# Patient Record
Sex: FEMALE | Race: WHITE | NOT HISPANIC OR LATINO | Employment: UNEMPLOYED | URBAN - METROPOLITAN AREA
[De-identification: names, ages, dates, MRNs, and addresses within clinical notes are randomized per-mention and may not be internally consistent; named-entity substitution may affect disease eponyms.]

---

## 2018-06-26 ENCOUNTER — RECORDS - HEALTHEAST (OUTPATIENT)
Dept: LAB | Facility: CLINIC | Age: 83
End: 2018-06-26

## 2018-06-26 LAB — TSH SERPL DL<=0.005 MIU/L-ACNC: 3.11 UIU/ML (ref 0.3–5)

## 2018-09-04 ENCOUNTER — RECORDS - HEALTHEAST (OUTPATIENT)
Dept: LAB | Facility: CLINIC | Age: 83
End: 2018-09-04

## 2018-09-04 LAB
ANION GAP SERPL CALCULATED.3IONS-SCNC: 8 MMOL/L (ref 5–18)
BUN SERPL-MCNC: 38 MG/DL (ref 8–28)
CALCIUM SERPL-MCNC: 9 MG/DL (ref 8.5–10.5)
CHLORIDE BLD-SCNC: 102 MMOL/L (ref 98–107)
CO2 SERPL-SCNC: 29 MMOL/L (ref 22–31)
CREAT SERPL-MCNC: 1.41 MG/DL (ref 0.6–1.1)
GFR SERPL CREATININE-BSD FRML MDRD: 35 ML/MIN/1.73M2
GLUCOSE BLD-MCNC: 129 MG/DL (ref 70–125)
POTASSIUM BLD-SCNC: 4 MMOL/L (ref 3.5–5)
SODIUM SERPL-SCNC: 139 MMOL/L (ref 136–145)

## 2019-01-24 ENCOUNTER — RECORDS - HEALTHEAST (OUTPATIENT)
Dept: LAB | Facility: CLINIC | Age: 84
End: 2019-01-24

## 2019-01-24 LAB
ANION GAP SERPL CALCULATED.3IONS-SCNC: 10 MMOL/L (ref 5–18)
BUN SERPL-MCNC: 33 MG/DL (ref 8–28)
CALCIUM SERPL-MCNC: 9.4 MG/DL (ref 8.5–10.5)
CHLORIDE BLD-SCNC: 99 MMOL/L (ref 98–107)
CO2 SERPL-SCNC: 28 MMOL/L (ref 22–31)
CREAT SERPL-MCNC: 1.44 MG/DL (ref 0.6–1.1)
ERYTHROCYTE [DISTWIDTH] IN BLOOD BY AUTOMATED COUNT: 13 % (ref 11–14.5)
GFR SERPL CREATININE-BSD FRML MDRD: 34 ML/MIN/1.73M2
GLUCOSE BLD-MCNC: 122 MG/DL (ref 70–125)
HCT VFR BLD AUTO: 39.6 % (ref 35–47)
HGB BLD-MCNC: 13 G/DL (ref 12–16)
MCH RBC QN AUTO: 30.7 PG (ref 27–34)
MCHC RBC AUTO-ENTMCNC: 32.8 G/DL (ref 32–36)
MCV RBC AUTO: 94 FL (ref 80–100)
PLATELET # BLD AUTO: 323 THOU/UL (ref 140–440)
PMV BLD AUTO: 9.6 FL (ref 8.5–12.5)
POTASSIUM BLD-SCNC: 4 MMOL/L (ref 3.5–5)
RBC # BLD AUTO: 4.23 MILL/UL (ref 3.8–5.4)
SODIUM SERPL-SCNC: 137 MMOL/L (ref 136–145)
TSH SERPL DL<=0.005 MIU/L-ACNC: 3.68 UIU/ML (ref 0.3–5)
WBC: 8.7 THOU/UL (ref 4–11)

## 2019-01-25 LAB — 25(OH)D3 SERPL-MCNC: 16.2 NG/ML (ref 30–80)

## 2019-08-07 ENCOUNTER — RECORDS - HEALTHEAST (OUTPATIENT)
Dept: LAB | Facility: CLINIC | Age: 84
End: 2019-08-07

## 2019-08-07 LAB
ERYTHROCYTE [DISTWIDTH] IN BLOOD BY AUTOMATED COUNT: 13.1 % (ref 11–14.5)
HCT VFR BLD AUTO: 34.9 % (ref 35–47)
HGB BLD-MCNC: 11.8 G/DL (ref 12–16)
MCH RBC QN AUTO: 31.9 PG (ref 27–34)
MCHC RBC AUTO-ENTMCNC: 33.8 G/DL (ref 32–36)
MCV RBC AUTO: 94 FL (ref 80–100)
PLATELET # BLD AUTO: 279 THOU/UL (ref 140–440)
PMV BLD AUTO: 9.1 FL (ref 8.5–12.5)
RBC # BLD AUTO: 3.7 MILL/UL (ref 3.8–5.4)
URATE SERPL-MCNC: 9.6 MG/DL (ref 2–7.5)
WBC: 7.6 THOU/UL (ref 4–11)

## 2019-10-15 ENCOUNTER — RECORDS - HEALTHEAST (OUTPATIENT)
Dept: LAB | Facility: CLINIC | Age: 84
End: 2019-10-15

## 2019-10-15 LAB
ANION GAP SERPL CALCULATED.3IONS-SCNC: 9 MMOL/L (ref 5–18)
BASOPHILS # BLD AUTO: 0 THOU/UL (ref 0–0.2)
BASOPHILS NFR BLD AUTO: 0 % (ref 0–2)
BNP SERPL-MCNC: 76 PG/ML (ref 0–167)
BUN SERPL-MCNC: 31 MG/DL (ref 8–28)
CALCIUM SERPL-MCNC: 9.3 MG/DL (ref 8.5–10.5)
CHLORIDE BLD-SCNC: 100 MMOL/L (ref 98–107)
CO2 SERPL-SCNC: 30 MMOL/L (ref 22–31)
CREAT SERPL-MCNC: 1.39 MG/DL (ref 0.6–1.1)
EOSINOPHIL # BLD AUTO: 0 THOU/UL (ref 0–0.4)
EOSINOPHIL NFR BLD AUTO: 0 % (ref 0–6)
ERYTHROCYTE [DISTWIDTH] IN BLOOD BY AUTOMATED COUNT: 12.8 % (ref 11–14.5)
GFR SERPL CREATININE-BSD FRML MDRD: 35 ML/MIN/1.73M2
GLUCOSE BLD-MCNC: 86 MG/DL (ref 70–125)
HCT VFR BLD AUTO: 38.1 % (ref 35–47)
HGB BLD-MCNC: 12.3 G/DL (ref 12–16)
LYMPHOCYTES # BLD AUTO: 1.3 THOU/UL (ref 0.8–4.4)
LYMPHOCYTES NFR BLD AUTO: 17 % (ref 20–40)
MCH RBC QN AUTO: 31.9 PG (ref 27–34)
MCHC RBC AUTO-ENTMCNC: 32.3 G/DL (ref 32–36)
MCV RBC AUTO: 99 FL (ref 80–100)
MONOCYTES # BLD AUTO: 0.6 THOU/UL (ref 0–0.9)
MONOCYTES NFR BLD AUTO: 7 % (ref 2–10)
NEUTROPHILS # BLD AUTO: 5.8 THOU/UL (ref 2–7.7)
NEUTROPHILS NFR BLD AUTO: 75 % (ref 50–70)
PLATELET # BLD AUTO: 310 THOU/UL (ref 140–440)
PMV BLD AUTO: 9.9 FL (ref 8.5–12.5)
POTASSIUM BLD-SCNC: 4.5 MMOL/L (ref 3.5–5)
RBC # BLD AUTO: 3.86 MILL/UL (ref 3.8–5.4)
SODIUM SERPL-SCNC: 139 MMOL/L (ref 136–145)
WBC: 7.8 THOU/UL (ref 4–11)

## 2021-03-18 ENCOUNTER — RECORDS - HEALTHEAST (OUTPATIENT)
Dept: LAB | Facility: CLINIC | Age: 86
End: 2021-03-18

## 2021-03-19 LAB
25(OH)D3 SERPL-MCNC: 74.6 NG/ML (ref 30–80)
ANION GAP SERPL CALCULATED.3IONS-SCNC: 12 MMOL/L (ref 5–18)
BASOPHILS # BLD AUTO: 0 THOU/UL (ref 0–0.2)
BASOPHILS NFR BLD AUTO: 0 % (ref 0–2)
BUN SERPL-MCNC: 30 MG/DL (ref 8–28)
CALCIUM SERPL-MCNC: 9 MG/DL (ref 8.5–10.5)
CHLORIDE BLD-SCNC: 100 MMOL/L (ref 98–107)
CO2 SERPL-SCNC: 29 MMOL/L (ref 22–31)
CREAT SERPL-MCNC: 1.22 MG/DL (ref 0.6–1.1)
EOSINOPHIL # BLD AUTO: 0 THOU/UL (ref 0–0.4)
EOSINOPHIL NFR BLD AUTO: 0 % (ref 0–6)
ERYTHROCYTE [DISTWIDTH] IN BLOOD BY AUTOMATED COUNT: 13.2 % (ref 11–14.5)
GFR SERPL CREATININE-BSD FRML MDRD: 41 ML/MIN/1.73M2
GLUCOSE BLD-MCNC: 95 MG/DL (ref 70–125)
HCT VFR BLD AUTO: 37.7 % (ref 35–47)
HGB BLD-MCNC: 12.6 G/DL (ref 12–16)
IMM GRANULOCYTES # BLD: 0.1 THOU/UL
IMM GRANULOCYTES NFR BLD: 1 %
LYMPHOCYTES # BLD AUTO: 1.2 THOU/UL (ref 0.8–4.4)
LYMPHOCYTES NFR BLD AUTO: 13 % (ref 20–40)
MCH RBC QN AUTO: 31.3 PG (ref 27–34)
MCHC RBC AUTO-ENTMCNC: 33.4 G/DL (ref 32–36)
MCV RBC AUTO: 94 FL (ref 80–100)
MONOCYTES # BLD AUTO: 0.8 THOU/UL (ref 0–0.9)
MONOCYTES NFR BLD AUTO: 8 % (ref 2–10)
NEUTROPHILS # BLD AUTO: 7.4 THOU/UL (ref 2–7.7)
NEUTROPHILS NFR BLD AUTO: 78 % (ref 50–70)
PLATELET # BLD AUTO: 313 THOU/UL (ref 140–440)
PMV BLD AUTO: 9.3 FL (ref 8.5–12.5)
POTASSIUM BLD-SCNC: 3.7 MMOL/L (ref 3.5–5)
RBC # BLD AUTO: 4.03 MILL/UL (ref 3.8–5.4)
SODIUM SERPL-SCNC: 141 MMOL/L (ref 136–145)
T4 FREE SERPL-MCNC: 1.3 NG/DL (ref 0.7–1.8)
TSH SERPL DL<=0.005 MIU/L-ACNC: 5.38 UIU/ML (ref 0.3–5)
WBC: 9.5 THOU/UL (ref 4–11)

## 2021-05-12 ENCOUNTER — RECORDS - HEALTHEAST (OUTPATIENT)
Dept: LAB | Facility: CLINIC | Age: 86
End: 2021-05-12

## 2021-05-13 LAB
ANION GAP SERPL CALCULATED.3IONS-SCNC: 12 MMOL/L (ref 5–18)
BUN SERPL-MCNC: 32 MG/DL (ref 8–28)
CALCIUM SERPL-MCNC: 9.1 MG/DL (ref 8.5–10.5)
CHLORIDE BLD-SCNC: 98 MMOL/L (ref 98–107)
CO2 SERPL-SCNC: 28 MMOL/L (ref 22–31)
CREAT SERPL-MCNC: 1.33 MG/DL (ref 0.6–1.1)
ERYTHROCYTE [DISTWIDTH] IN BLOOD BY AUTOMATED COUNT: 14.1 % (ref 11–14.5)
GFR SERPL CREATININE-BSD FRML MDRD: 37 ML/MIN/1.73M2
GLUCOSE BLD-MCNC: 98 MG/DL (ref 70–125)
HCT VFR BLD AUTO: 39.7 % (ref 35–47)
HGB BLD-MCNC: 13.1 G/DL (ref 12–16)
MCH RBC QN AUTO: 30.8 PG (ref 27–34)
MCHC RBC AUTO-ENTMCNC: 33 G/DL (ref 32–36)
MCV RBC AUTO: 93 FL (ref 80–100)
PLATELET # BLD AUTO: 319 THOU/UL (ref 140–440)
PMV BLD AUTO: 9.3 FL (ref 8.5–12.5)
POTASSIUM BLD-SCNC: 3.7 MMOL/L (ref 3.5–5)
RBC # BLD AUTO: 4.25 MILL/UL (ref 3.8–5.4)
SODIUM SERPL-SCNC: 138 MMOL/L (ref 136–145)
TSH SERPL DL<=0.005 MIU/L-ACNC: 5.47 UIU/ML (ref 0.3–5)
VIT B12 SERPL-MCNC: 248 PG/ML (ref 213–816)
WBC: 8.8 THOU/UL (ref 4–11)

## 2021-05-14 LAB — 25(OH)D3 SERPL-MCNC: 76.5 NG/ML (ref 30–80)

## 2021-06-28 ENCOUNTER — RECORDS - HEALTHEAST (OUTPATIENT)
Dept: LAB | Facility: CLINIC | Age: 86
End: 2021-06-28

## 2021-06-28 LAB
ANION GAP SERPL CALCULATED.3IONS-SCNC: 17 MMOL/L (ref 5–18)
BASOPHILS # BLD AUTO: 0 THOU/UL (ref 0–0.2)
BASOPHILS NFR BLD AUTO: 0 % (ref 0–2)
BUN SERPL-MCNC: 27 MG/DL (ref 8–28)
CALCIUM SERPL-MCNC: 9.4 MG/DL (ref 8.5–10.5)
CHLORIDE BLD-SCNC: 96 MMOL/L (ref 98–107)
CO2 SERPL-SCNC: 27 MMOL/L (ref 22–31)
CREAT SERPL-MCNC: 1.18 MG/DL (ref 0.6–1.1)
EOSINOPHIL # BLD AUTO: 0 THOU/UL (ref 0–0.4)
EOSINOPHIL NFR BLD AUTO: 0 % (ref 0–6)
ERYTHROCYTE [DISTWIDTH] IN BLOOD BY AUTOMATED COUNT: 14.8 % (ref 11–14.5)
GFR SERPL CREATININE-BSD FRML MDRD: 43 ML/MIN/1.73M2
GLUCOSE BLD-MCNC: 99 MG/DL (ref 70–125)
HCT VFR BLD AUTO: 44.1 % (ref 35–47)
HGB BLD-MCNC: 14.8 G/DL (ref 12–16)
IMM GRANULOCYTES # BLD: 0.1 THOU/UL
IMM GRANULOCYTES NFR BLD: 1 %
LYMPHOCYTES # BLD AUTO: 1.6 THOU/UL (ref 0.8–4.4)
LYMPHOCYTES NFR BLD AUTO: 15 % (ref 20–40)
MCH RBC QN AUTO: 31.6 PG (ref 27–34)
MCHC RBC AUTO-ENTMCNC: 33.6 G/DL (ref 32–36)
MCV RBC AUTO: 94 FL (ref 80–100)
MONOCYTES # BLD AUTO: 0.8 THOU/UL (ref 0–0.9)
MONOCYTES NFR BLD AUTO: 8 % (ref 2–10)
NEUTROPHILS # BLD AUTO: 8.1 THOU/UL (ref 2–7.7)
NEUTROPHILS NFR BLD AUTO: 76 % (ref 50–70)
PLATELET # BLD AUTO: 390 THOU/UL (ref 140–440)
PMV BLD AUTO: 9.2 FL (ref 8.5–12.5)
POTASSIUM BLD-SCNC: 4.1 MMOL/L (ref 3.5–5)
PROCALCITONIN SERPL-MCNC: 0.03 NG/ML (ref 0–0.49)
RBC # BLD AUTO: 4.69 MILL/UL (ref 3.8–5.4)
SODIUM SERPL-SCNC: 140 MMOL/L (ref 136–145)
WBC: 10.7 THOU/UL (ref 4–11)

## 2021-07-08 ENCOUNTER — HOSPITAL ENCOUNTER (INPATIENT)
Dept: INTENSIVE CARE | Facility: CLINIC | Age: 86
LOS: 4 days | Discharge: HOSPICE | DRG: 186 | End: 2021-07-12
Attending: STUDENT IN AN ORGANIZED HEALTH CARE EDUCATION/TRAINING PROGRAM | Admitting: HOSPITALIST
Payer: MEDICARE

## 2021-07-08 ENCOUNTER — MEDICAL CORRESPONDENCE (OUTPATIENT)
Dept: HEALTH INFORMATION MANAGEMENT | Facility: CLINIC | Age: 86
End: 2021-07-08

## 2021-07-08 DIAGNOSIS — I48.19 PERSISTENT ATRIAL FIBRILLATION (H): ICD-10-CM

## 2021-07-08 DIAGNOSIS — I48.91 ATRIAL FIBRILLATION WITH RAPID VENTRICULAR RESPONSE (H): ICD-10-CM

## 2021-07-08 DIAGNOSIS — J90 PLEURAL EFFUSION: ICD-10-CM

## 2021-07-08 DIAGNOSIS — I89.0 LYMPHEDEMA: Primary | ICD-10-CM

## 2021-07-08 DIAGNOSIS — J96.01 ACUTE RESPIRATORY FAILURE WITH HYPOXIA (H): ICD-10-CM

## 2021-07-08 DIAGNOSIS — J96.11 CHRONIC RESPIRATORY FAILURE WITH HYPOXIA (H): ICD-10-CM

## 2021-07-08 DIAGNOSIS — I48.91 ATRIAL FIBRILLATION, UNSPECIFIED TYPE (H): ICD-10-CM

## 2021-07-08 LAB
ALBUMIN SERPL-MCNC: 3.6 G/DL (ref 3.5–5)
ALP SERPL-CCNC: 87 U/L (ref 45–120)
ALT SERPL W P-5'-P-CCNC: 13 U/L (ref 0–45)
ANION GAP SERPL CALCULATED.3IONS-SCNC: 16 MMOL/L (ref 5–18)
APTT PPP: 29 SECONDS (ref 24–37)
AST SERPL W P-5'-P-CCNC: 19 U/L (ref 0–40)
BASOPHILS # BLD AUTO: 0 THOU/UL (ref 0–0.2)
BASOPHILS NFR BLD AUTO: 0 % (ref 0–2)
BILIRUB SERPL-MCNC: 1.1 MG/DL (ref 0–1)
BNP SERPL-MCNC: 115 PG/ML (ref 0–167)
BUN SERPL-MCNC: 27 MG/DL (ref 8–28)
CALCIUM SERPL-MCNC: 9.5 MG/DL (ref 8.5–10.5)
CHLORIDE BLD-SCNC: 97 MMOL/L (ref 98–107)
CO2 SERPL-SCNC: 29 MMOL/L (ref 22–31)
CREAT SERPL-MCNC: 1.3 MG/DL (ref 0.6–1.1)
EOSINOPHIL # BLD AUTO: 0 THOU/UL (ref 0–0.4)
EOSINOPHIL NFR BLD AUTO: 0 % (ref 0–6)
ERYTHROCYTE [DISTWIDTH] IN BLOOD BY AUTOMATED COUNT: 14.9 % (ref 11–14.5)
GFR SERPL CREATININE-BSD FRML MDRD: 38 ML/MIN/1.73M2
GLUCOSE BLD-MCNC: 112 MG/DL (ref 70–125)
HCT VFR BLD AUTO: 45.4 % (ref 35–47)
HGB BLD-MCNC: 15.3 G/DL (ref 12–16)
IMM GRANULOCYTES # BLD: 0.1 THOU/UL
IMM GRANULOCYTES NFR BLD: 1 %
INR PPP: 0.97 (ref 0.9–1.1)
LYMPHOCYTES # BLD AUTO: 2 THOU/UL (ref 0.8–4.4)
LYMPHOCYTES NFR BLD AUTO: 15 % (ref 20–40)
MAGNESIUM SERPL-MCNC: 1.4 MG/DL (ref 1.8–2.6)
MCH RBC QN AUTO: 31.5 PG (ref 27–34)
MCHC RBC AUTO-ENTMCNC: 33.7 G/DL (ref 32–36)
MCV RBC AUTO: 93 FL (ref 80–100)
MONOCYTES # BLD AUTO: 0.9 THOU/UL (ref 0–0.9)
MONOCYTES NFR BLD AUTO: 7 % (ref 2–10)
NEUTROPHILS # BLD AUTO: 10.2 THOU/UL (ref 2–7.7)
NEUTROPHILS NFR BLD AUTO: 77 % (ref 50–70)
PLATELET # BLD AUTO: 395 THOU/UL (ref 140–440)
PMV BLD AUTO: 8.5 FL (ref 8.5–12.5)
POTASSIUM BLD-SCNC: 3.7 MMOL/L (ref 3.5–5)
PROCALCITONIN SERPL-MCNC: 0.03 NG/ML (ref 0–0.49)
PROT SERPL-MCNC: 7 G/DL (ref 6–8)
RBC # BLD AUTO: 4.86 MILL/UL (ref 3.8–5.4)
SARS-COV-2 PCR COMMENT: NORMAL
SARS-COV-2 RNA SPEC QL NAA+PROBE: NEGATIVE
SARS-COV-2 VIRUS SPECIMEN SOURCE: NORMAL
SODIUM SERPL-SCNC: 142 MMOL/L (ref 136–145)
TROPONIN I SERPL-MCNC: 0.04 NG/ML (ref 0–0.29)
WBC: 13.2 THOU/UL (ref 4–11)

## 2021-07-08 PROCEDURE — C9803 HOPD COVID-19 SPEC COLLECT: HCPCS

## 2021-07-08 PROCEDURE — 84484 ASSAY OF TROPONIN QUANT: CPT

## 2021-07-08 PROCEDURE — 80053 COMPREHEN METABOLIC PANEL: CPT

## 2021-07-08 PROCEDURE — 99291 CRITICAL CARE FIRST HOUR: CPT | Mod: 25

## 2021-07-08 PROCEDURE — 85610 PROTHROMBIN TIME: CPT

## 2021-07-08 PROCEDURE — 999N001212 HC REV CODE 9999 CHARGE CONVERSION OPNP

## 2021-07-08 PROCEDURE — 96366 THER/PROPH/DIAG IV INF ADDON: CPT

## 2021-07-08 PROCEDURE — 96375 TX/PRO/DX INJ NEW DRUG ADDON: CPT

## 2021-07-08 PROCEDURE — 85730 THROMBOPLASTIN TIME PARTIAL: CPT

## 2021-07-08 PROCEDURE — 96365 THER/PROPH/DIAG IV INF INIT: CPT

## 2021-07-08 PROCEDURE — 36415 COLL VENOUS BLD VENIPUNCTURE: CPT

## 2021-07-08 PROCEDURE — U0005 INFEC AGEN DETEC AMPLI PROBE: HCPCS

## 2021-07-08 PROCEDURE — 83880 ASSAY OF NATRIURETIC PEPTIDE: CPT

## 2021-07-08 PROCEDURE — 96376 TX/PRO/DX INJ SAME DRUG ADON: CPT

## 2021-07-08 PROCEDURE — 83735 ASSAY OF MAGNESIUM: CPT

## 2021-07-08 PROCEDURE — 84145 PROCALCITONIN (PCT): CPT

## 2021-07-08 PROCEDURE — 96368 THER/DIAG CONCURRENT INF: CPT

## 2021-07-08 PROCEDURE — 71045 X-RAY EXAM CHEST 1 VIEW: CPT

## 2021-07-08 PROCEDURE — 93005 ELECTROCARDIOGRAM TRACING: CPT

## 2021-07-08 PROCEDURE — U0003 INFECTIOUS AGENT DETECTION BY NUCLEIC ACID (DNA OR RNA); SEVERE ACUTE RESPIRATORY SYNDROME CORONAVIRUS 2 (SARS-COV-2) (CORONAVIRUS DISEASE [COVID-19]), AMPLIFIED PROBE TECHNIQUE, MAKING USE OF HIGH THROUGHPUT TECHNOLOGIES AS DESCRIBED BY CMS-2020-01-R: HCPCS

## 2021-07-08 PROCEDURE — 99292 CRITICAL CARE ADDL 30 MIN: CPT

## 2021-07-08 PROCEDURE — 85025 COMPLETE CBC W/AUTO DIFF WBC: CPT

## 2021-07-08 RX ORDER — LANOLIN ALCOHOL/MO/W.PET/CERES
3 CREAM (GRAM) TOPICAL
Status: SHIPPED | COMMUNITY
Start: 2021-07-08

## 2021-07-08 RX ORDER — ASCORBIC ACID 500 MG
500 TABLET ORAL DAILY
Status: SHIPPED | COMMUNITY
Start: 2021-07-08

## 2021-07-08 RX ORDER — LEVOTHYROXINE SODIUM 75 UG/1
75 TABLET ORAL DAILY
Status: SHIPPED | COMMUNITY
Start: 2021-07-08 | End: 2021-07-12

## 2021-07-08 RX ORDER — TRAZODONE HYDROCHLORIDE 50 MG/1
25 TABLET, FILM COATED ORAL AT BEDTIME
Status: SHIPPED | COMMUNITY
Start: 2021-07-08 | End: 2021-07-12

## 2021-07-08 RX ORDER — AMOXICILLIN 250 MG
1 CAPSULE ORAL DAILY PRN
Status: SHIPPED | COMMUNITY
Start: 2021-07-08 | End: 2021-07-12

## 2021-07-08 RX ORDER — MENTHOL AND METHYL SALICYLATE 7.6; 29 G/100G; G/100G
1 OINTMENT TOPICAL 2 TIMES DAILY PRN
Status: SHIPPED | COMMUNITY
Start: 2021-07-08 | End: 2021-07-12

## 2021-07-08 RX ORDER — SENNOSIDES 8.6 MG
1300 CAPSULE ORAL EVERY EVENING
Status: SHIPPED | COMMUNITY
Start: 2021-07-08

## 2021-07-08 RX ORDER — FUROSEMIDE 20 MG
20 TABLET ORAL DAILY
Status: SHIPPED | COMMUNITY
Start: 2021-07-08 | End: 2021-07-12

## 2021-07-08 RX ORDER — LOSARTAN POTASSIUM 25 MG/1
25 TABLET ORAL DAILY
Status: SHIPPED | COMMUNITY
Start: 2021-07-08 | End: 2021-07-12

## 2021-07-08 ASSESSMENT — MIFFLIN-ST. JEOR: SCORE: 1336.18

## 2021-07-09 ENCOUNTER — COMMUNICATION - HEALTHEAST (OUTPATIENT)
Dept: SCHEDULING | Facility: CLINIC | Age: 86
End: 2021-07-09

## 2021-07-09 LAB
ANION GAP SERPL CALCULATED.3IONS-SCNC: 14 MMOL/L (ref 5–18)
APPEARANCE FLD: CLEAR
ATRIAL RATE - MUSE: 166 BPM
BASOPHILS # BLD AUTO: 0 THOU/UL (ref 0–0.2)
BASOPHILS NFR BLD AUTO: 0 % (ref 0–2)
BUN SERPL-MCNC: 29 MG/DL (ref 8–28)
CALCIUM SERPL-MCNC: 9 MG/DL (ref 8.5–10.5)
CHLORIDE BLD-SCNC: 98 MMOL/L (ref 98–107)
CO2 SERPL-SCNC: 29 MMOL/L (ref 22–31)
COLOR FLD: YELLOW
CREAT SERPL-MCNC: 1.15 MG/DL (ref 0.6–1.1)
DIASTOLIC BLOOD PRESSURE - MUSE: NORMAL
EOSINOPHIL # BLD AUTO: 0 THOU/UL (ref 0–0.4)
EOSINOPHIL NFR BLD AUTO: 0 % (ref 0–6)
EOSINOPHIL NFR FLD MANUAL: ABNORMAL %
ERYTHROCYTE [DISTWIDTH] IN BLOOD BY AUTOMATED COUNT: 14.9 % (ref 11–14.5)
GFR SERPL CREATININE-BSD FRML MDRD: 44 ML/MIN/1.73M2
GLUCOSE BLD-MCNC: 96 MG/DL (ref 70–125)
HCT VFR BLD AUTO: 41.1 % (ref 35–47)
HGB BLD-MCNC: 13.8 G/DL (ref 12–16)
IMM GRANULOCYTES # BLD: 0.1 THOU/UL
IMM GRANULOCYTES NFR BLD: 1 %
INTERPRETATION ECG - MUSE: NORMAL
LDH FLD L TO P-CCNC: 104 U/L
LDH SERPL L TO P-CCNC: 221 U/L (ref 125–220)
LYMPHOCYTES # BLD AUTO: 1.1 THOU/UL (ref 0.8–4.4)
LYMPHOCYTES NFR BLD AUTO: 8 % (ref 20–40)
LYMPHOCYTES NFR FLD MANUAL: 79 %
MACROPHAGE % - HISTORICAL: ABNORMAL
MAGNESIUM SERPL-MCNC: 1.8 MG/DL (ref 1.8–2.6)
MCH RBC QN AUTO: 31.1 PG (ref 27–34)
MCHC RBC AUTO-ENTMCNC: 33.6 G/DL (ref 32–36)
MCV RBC AUTO: 93 FL (ref 80–100)
MESOTHELIALS, FLUID: 6 %
MONOCYTE % - HISTORICAL: 13 %
MONOCYTES # BLD AUTO: 1.2 THOU/UL (ref 0–0.9)
MONOCYTES NFR BLD AUTO: 9 % (ref 2–10)
NEUTROPHILS # BLD AUTO: 10.5 THOU/UL (ref 2–7.7)
NEUTROPHILS NFR BLD AUTO: 82 % (ref 50–70)
NEUTS BAND NFR FLD MANUAL: 2 %
OTHER CELLS FLD MANUAL: ABNORMAL
P AXIS - MUSE: NORMAL
PLATELET # BLD AUTO: 308 THOU/UL (ref 140–440)
PMV BLD AUTO: 8.4 FL (ref 8.5–12.5)
POTASSIUM BLD-SCNC: 3.6 MMOL/L (ref 3.5–5)
PR INTERVAL - MUSE: NORMAL
PROT FLD-MCNC: 3.4 G/DL
QRS DURATION - MUSE: 94 MS
QT - MUSE: 290 MS
QTC - MUSE: 478 MS
R AXIS - MUSE: -12 DEGREES
RBC # BLD AUTO: 4.44 MILL/UL (ref 3.8–5.4)
RBC FLUID - HISTORICAL: ABNORMAL /UL
SODIUM SERPL-SCNC: 141 MMOL/L (ref 136–145)
SYSTOLIC BLOOD PRESSURE - MUSE: NORMAL
T AXIS - MUSE: 180 DEGREES
TSH SERPL DL<=0.005 MIU/L-ACNC: 2.97 UIU/ML (ref 0.3–5)
VENTRICULAR RATE- MUSE: 164 BPM
WBC # FLD AUTO: 425 /UL (ref 0–99)
WBC: 12.8 THOU/UL (ref 4–11)

## 2021-07-09 PROCEDURE — 97162 PT EVAL MOD COMPLEX 30 MIN: CPT | Mod: GP

## 2021-07-09 PROCEDURE — 0W9B3ZZ DRAINAGE OF LEFT PLEURAL CAVITY, PERCUTANEOUS APPROACH: ICD-10-PCS | Performed by: RADIOLOGY

## 2021-07-09 PROCEDURE — 88305 TISSUE EXAM BY PATHOLOGIST: CPT | Mod: TC

## 2021-07-09 PROCEDURE — 84157 ASSAY OF PROTEIN OTHER: CPT

## 2021-07-09 PROCEDURE — 32555 ASPIRATE PLEURA W/ IMAGING: CPT

## 2021-07-09 PROCEDURE — 999N001212 HC REV CODE 9999 CHARGE CONVERSION OPNP: Mod: GP

## 2021-07-09 PROCEDURE — 87015 SPECIMEN INFECT AGNT CONCNTJ: CPT

## 2021-07-09 PROCEDURE — 84443 ASSAY THYROID STIM HORMONE: CPT

## 2021-07-09 PROCEDURE — 80048 BASIC METABOLIC PNL TOTAL CA: CPT

## 2021-07-09 PROCEDURE — 88112 CYTOPATH CELL ENHANCE TECH: CPT | Mod: TC

## 2021-07-09 PROCEDURE — 85025 COMPLETE CBC W/AUTO DIFF WBC: CPT

## 2021-07-09 PROCEDURE — 83615 LACTATE (LD) (LDH) ENZYME: CPT

## 2021-07-09 PROCEDURE — 87205 SMEAR GRAM STAIN: CPT

## 2021-07-09 PROCEDURE — 83735 ASSAY OF MAGNESIUM: CPT

## 2021-07-09 PROCEDURE — 87070 CULTURE OTHR SPECIMN AEROBIC: CPT

## 2021-07-09 PROCEDURE — 36415 COLL VENOUS BLD VENIPUNCTURE: CPT

## 2021-07-09 PROCEDURE — 97535 SELF CARE MNGMENT TRAINING: CPT | Mod: GP

## 2021-07-09 PROCEDURE — 89051 BODY FLUID CELL COUNT: CPT

## 2021-07-09 PROCEDURE — 87075 CULTR BACTERIA EXCEPT BLOOD: CPT

## 2021-07-09 NOTE — PROGRESS NOTES
Progress Notes by Bhavani Esposito RN at 7/9/2021  4:49 PM     Author: Bhavani Esposito RN Service: -- Author Type: RN, Care Manager    Filed: 7/9/2021  4:49 PM Date of Service: 7/9/2021  4:49 PM Status: Signed    : Bhavani Esposito RN (RN, Care Manager)       Per Lary in admissions at [facility], patient resides in enhanced [facility].

## 2021-07-09 NOTE — PLAN OF CARE
Plan of Care by Rush Bryan RN at 7/9/2021  5:29 AM     Author: Rush Bryan RN Service: -- Author Type: Registered Nurse    Filed: 7/9/2021  6:24 AM Date of Service: 7/9/2021  5:29 AM Status: Signed    : Rush Bryan RN (Registered Nurse)         Problem: Pain  Goal: Patient's pain/discomfort is manageable  7/9/2021 0530 by Rush Bryan RN  Outcome: Progressing    Problem: Irregular heart rhythm  Goal: Heart rate is controlled  or atrial fibrillation is converted to sinus rhythm  Outcome: Progressing     Problem: Potential for hemodynamic instability  Goal: Cardiac output is adequate  Outcome: Progressing     Admitted to unit. A-fib with RVR on telemetry. HR in the 110s to 130s. Diltiazem drip initiated. On 3L NC, lung sounds diminished with expiratory wheezes. SpO2= 100%. Q2 hour turns.

## 2021-07-09 NOTE — ED PROVIDER NOTES
ED Provider Notes by Hari Jimenez DO at 2021  6:40 PM     Author: Hari Jimenez DO Service: Emergency Medicine Author Type: Physician    Filed: 2021  8:48 PM Date of Service: 2021  6:40 PM Status: Addendum    : Hari Jimenez DO (Physician)    Related Notes: Original Note by Hari Jimenez DO (Physician) filed at 2021  8:47 PM     Procedure Orders    1. Critical Care [[id number]] ordered by Hari Jimenez DO             EMERGENCY DEPARTMENT ENCOUnter      NAME: Raquel Brown  AGE: 89 y.o. female  YOB: 1925  MRN: [id number]  EVALUATION DATE & TIME: 2021  6:17 PM    PCP: Christian Sue MD    ED PROVIDER: Hari Jimenez DO      Chief Complaint   Patient presents with   ? Atrial Fibrillation   ? Shortness of Breath         FINAL IMPRESSION:  A. fib with RVR.      ED COURSE & MEDICAL DECISION MAKIN:41 PM I met with the patient, obtained an initial history, performed an examination and discussed the plan. [place] PPE was worn during the entire patient encounter, including: N95 mask, surgical mask, goggles, surgical cap, and gloves.      ED Course as of 1905   Thu 2021   1856 89-year-old female, DNR history of hypertension, hyperlipidemia, here from the nursing home with shortness of breath that has been worsening over the past few days otherwise the story is somewhat ambiguous to when exactly her symptoms started. Patient denies any chest pain. Mild shortness of breath. No abdominal pain. No vomiting. No leg swelling. No hemoptysis. On arrival she is tachycardic. Blood pressure stable. Oxygenation was initially mildly low. Heart and lungs showing wheezing bilaterally. Rapid heart rate. No leg swelling. No abdominal pain. Patient is alert and oriented. Plan for cardiopulmonary evaluation, chest x-ray most likely admission.    [SO]    EKG is A. fib with RVR, rate of 164, nonspecific ST changes throughout, no STEMI, . No old EKGs for  comparison.    [SO]      ED Course User Index  [SO] Ohl, Hari Silvat, DO     -Patient's labs overall reassuring.  Mild white count.  Chest x-ray with large left pleural effusion.  Patient was given Lasix, antibiotics and diltiazem bolus x2.  -Rate still not controlled, placed on a drip.  Discussed case with hospitalist at bedside.                At the conclusion of the encounter I discussed  the results of all of the tests and the disposition with patient.   All questions were answered.  The patient acknowledged understanding and was involved in the decision making regarding the overall care plan.       I discussed with patient the utility, limitations and findings of the exam/interventions/studies done during this visit as well as the list of differential diagnosis and symptoms to monitor/return to ER for.  Additional verbal discharge instructions were provided.           MEDICATIONS GIVEN IN THE EMERGENCY:  Medications   sodium chloride flush 10 mL (NS) (has no administration in time range)   magnesium sulfate in water 2 gram/50 mL (4 %) IVPB 2 g (has no administration in time range)       NEW PRESCRIPTIONS STARTED AT TODAY'S ER VISIT  There are no discharge medications for this patient.         =================================================================    HPI    Patient information was obtained from: Patient    Use of Intrepreter: N/A       Raquel Brown is a 89 y.o. female who presents the the ED for evaluation of shortness of breath.    The patient is here from the nursing home with shortness of breath that has been worsening over the past few days otherwise the story is somewhat ambiguous to when exactly her symptoms started. Patient is otherwise in her usual state of health and denies any chest pain, abdominal pain, vomiting, leg swelling, hemoptysis, or any other concerns at this time.       REVIEW OF SYSTEMS   Review of Systems   Constitutional: Negative for fever, chills and fatigue.   HENT:  Negative for neck pain.    Eyes: Negative for visual disturbance.   Respiratory: Negative for chest tightness. Positive for shortness of breath.    Cardiovascular: Negative for chest pain.   Gastrointestinal: Negative for nausea, vomiting, abdominal pain and diarrhea.   Genitourinary: Negative for dysuria.   Musculoskeletal: Negative for back pain.   Skin: Negative for color change.   Neurological: Negative for dizziness, weakness and numbness.   All other systems reviewed and are negative.    PAST MEDICAL HISTORY:  History reviewed. No pertinent past medical history.    PAST SURGICAL HISTORY:  History reviewed. No pertinent surgical history.        CURRENT MEDICATIONS:    No current facility-administered medications on file prior to encounter.      No current outpatient medications on file prior to encounter.       ALLERGIES:  Allergies   Allergen Reactions   ? Ace Inhibitors    ? Latex    ? Pravastatin        FAMILY HISTORY:  History reviewed. No pertinent family history.    SOCIAL HISTORY:   Social History     Socioeconomic History   ? Marital status:      Spouse name: None   ? Number of children: None   ? Years of education: None   ? Highest education level: None   Occupational History   ? None   Social Needs   ? Financial resource strain: None   ? Food insecurity     Worry: None     Inability: None   ? Transportation needs     Medical: None     Non-medical: None   Tobacco Use   ? Smoking status: None   Substance and Sexual Activity   ? Alcohol use: None   ? Drug use: None   ? Sexual activity: None   Lifestyle   ? Physical activity     Days per week: None     Minutes per session: None   ? Stress: None   Relationships   ? Social connections     Talks on phone: None     Gets together: None     Attends Muslim service: None     Active member of club or organization: None     Attends meetings of clubs or organizations: None     Relationship status: None   ? Intimate partner violence     Fear of current or  ex partner: None     Emotionally abused: None     Physically abused: None     Forced sexual activity: None   Other Topics Concern   ? None   Social History Narrative   ? None       VITALS:  Patient Vitals for the past 24 hrs:   BP Temp Temp src Pulse Resp SpO2 Weight   07/08/21 1823 (!) 138/106 97.8  F (36.6  C) Oral -- -- -- --   07/08/21 1821 -- -- Oral (!) 190 16 98 % 209 lb (94.8 kg)           PHYSICAL EXAM    Constitutional:  Well developed, well nourished, no acute distress   EYES: Conjunctivae clear, EOMI  HENT:  Atraumatic, external ears normal, nose normal, oropharynx moist. Neck- supple   Respiratory: No respiratory distress.  Wheezing and coarse breath sounds on the right, decreased breath sounds on the left.  Cardiovascular:  Normal rate, normal rhythm, no murmurs, capillary refill normal.  No chest tenderness  GI:  Soft, nondistended, nontender, no palpable masses, no rebound, no guarding   : No CVA tenderness  Musculoskeletal:  No edema.  Range of motion major extremities intact. No tenderness to palpation or major deformities noted.    Integument: Warm, Dry, No erythema, No rash.   Neurologic:  Alert & oriented, no focal deficits noted, ambulatory  Psych: Affect normal, Mood normal.             LAB:  All pertinent labs reviewed and interpreted.  Results for orders placed or performed during the hospital encounter of 07/08/21   Troponin I   Result Value Ref Range    Troponin I 0.04 0.00 - 0.29 ng/mL   Comprehensive Metabolic Panel   Result Value Ref Range    Sodium 142 136 - 145 mmol/L    Potassium 3.7 3.5 - 5.0 mmol/L    Chloride 97 (L) 98 - 107 mmol/L    CO2 29 22 - 31 mmol/L    Anion Gap, Calculation 16 5 - 18 mmol/L    Glucose 112 70 - 125 mg/dL    BUN 27 8 - 28 mg/dL    Creatinine 1.30 (H) 0.60 - 1.10 mg/dL    GFR MDRD Af Amer 46 (L) >60 mL/min/1.73m2    GFR MDRD Non Af Amer 38 (L) >60 mL/min/1.73m2    Bilirubin, Total 1.1 (H) 0.0 - 1.0 mg/dL    Calcium 9.5 8.5 - 10.5 mg/dL    Protein, Total 7.0  6.0 - 8.0 g/dL    Albumin 3.6 3.5 - 5.0 g/dL    Alkaline Phosphatase 87 45 - 120 U/L    AST 19 0 - 40 U/L    ALT 13 0 - 45 U/L   Magnesium   Result Value Ref Range    Magnesium 1.4 (L) 1.8 - 2.6 mg/dL   BNP(B-type Natriuretic Peptide)   Result Value Ref Range     0 - 167 pg/mL   APTT(PTT)   Result Value Ref Range    PTT 29 24 - 37 seconds   INR   Result Value Ref Range    INR 0.97 0.90 - 1.10   HM1 (CBC with Diff)   Result Value Ref Range    WBC 13.2 (H) 4.0 - 11.0 thou/uL    RBC 4.86 3.80 - 5.40 mill/uL    Hemoglobin 15.3 12.0 - 16.0 g/dL    Hematocrit 45.4 35.0 - 47.0 %    MCV 93 80 - 100 fL    MCH 31.5 27.0 - 34.0 pg    MCHC 33.7 32.0 - 36.0 g/dL    RDW 14.9 (H) 11.0 - 14.5 %    Platelets 395 140 - 440 thou/uL    MPV 8.5 8.5 - 12.5 fL    Neutrophils % 77 (H) 50 - 70 %    Lymphocytes % 15 (L) 20 - 40 %    Monocytes % 7 2 - 10 %    Eosinophils % 0 0 - 6 %    Basophils % 0 0 - 2 %    Immature Granulocyte % 1 (H) <=0 %    Neutrophils Absolute 10.2 (H) 2.0 - 7.7 thou/uL    Lymphocytes Absolute 2.0 0.8 - 4.4 thou/uL    Monocytes Absolute 0.9 0.0 - 0.9 thou/uL    Eosinophils Absolute 0.0 0.0 - 0.4 thou/uL    Basophils Absolute 0.0 0.0 - 0.2 thou/uL    Immature Granulocyte Absolute 0.1 (H) <=0.0 thou/uL       RADIOLOGY:  Reviewed all pertinent imaging. Please see official radiology report.  Xr Chest 1 View Portable    Result Date: 7/8/2021  EXAM: XR CHEST 1 VIEW PORTABLE LOCATION: [facility] DATE/TIME: 7/8/2021 6:58 PM INDICATION: sob COMPARISON: 4/10/2015     New large left pleural effusion. Right lung clear other than stable small calcified granuloma. Heart size difficult to assess.        I have independently reviewed and interpreted the EKG(s) documented above.    Critical Care  Performed by: Hari Jimenez DO  Authorized by: Hari Jimenez DO   Total critical care time: 120 minutes  Critical care time was exclusive of separately billable procedures and treating other patients  and teaching time.  Critical care was necessary to treat or prevent imminent or life-threatening deterioration of the following conditions: cardiac failure.  Critical care was time spent personally by me on the following activities: blood draw for specimens, evaluation of patient's response to treatment, obtaining history from patient or surrogate, ordering and review of laboratory studies, pulse oximetry, review of old charts, development of treatment plan with patient or surrogate, examination of patient, ordering and performing treatments and interventions, ordering and review of radiographic studies and re-evaluation of patient's condition.            I, Obed Cedeno, am serving as a scribe to document services personally performed by Dr. Hari Jimenez based on my observation and the provider's statements to me. I, Hari Jimenez, DO attest that Obed Cedeno is acting in a scribe capacity, has observed my performance of the services and has documented them in accordance with my direction.    Hari Jimenez D.O.  Emergency Medicine  [facility] EMERGENCY ROOM  [address] [address]  Dept: [phone]  Loc: [phone]     Hari Jimenez DO  07/08/21 2047       Hari Jimenez DO  07/08/21 2048

## 2021-07-09 NOTE — PROGRESS NOTES
Progress Notes by Lary Baires PT at 7/9/2021  1:58 PM     Author: Lary Baires PT Service: -- Author Type: Physical Therapist    Filed: 7/9/2021  1:59 PM Date of Service: 7/9/2021  1:58 PM Status: Signed    : Lary Baires PT (Physical Therapist)       Lymphedema  Pt seen for initial lymphedema evaluation.  Pt presents with bilateral LE edema.  Pt fit with short stretch bandages.  Pt is to wear as much as tolerated. They can be removed if too painful or if pt has sudden onset of SOB.  Will be back tomorrow 7/10 to reassess swelling.  Please call [phone] with any questions.    7/9/2021 by Lary Baires PT, DPT

## 2021-07-09 NOTE — PROGRESS NOTES
Progress Notes by Yamileth George PharmSTACEY at 7/8/2021  7:40 PM     Author: Yamileth George PharmD Service: Pharmacy Author Type: Pharmacist    Filed: 7/8/2021  7:41 PM Date of Service: 7/8/2021  7:40 PM Status: Signed    : Yamileth George PharmD (Pharmacist)       Pharmacy Note - Admission Medication History    Pertinent Provider Information:      ______________________________________________________________________    Prior To Admission (PTA) med list completed and updated in EMR.       PTA Med List   Medication Sig Last Dose   ? acetaminophen (TYLENOL) 650 MG CR tablet Take 1,300 mg by mouth every evening. 7/7/2021 at AM   ? ascorbic acid, vitamin C, (VITAMIN C) 500 MG tablet Take 500 mg by mouth daily. 7/8/2021 at AM   ? cholecalciferol, vitamin D3, 1,000 unit (25 mcg) tablet Take 2,000 Units by mouth daily. 7/8/2021 at AM   ? furosemide (LASIX) 20 MG tablet Take 20 mg by mouth daily. 7/8/2021 at AM   ? guaiFENesin (ROBITUSSIN) 100 mg/5 mL syrup Take 300 mg by mouth every 4 (four) hours as needed for cough.    ? levothyroxine (SYNTHROID, LEVOTHROID) 75 MCG tablet Take 75 mcg by mouth Daily at 6:00 am. 7/8/2021 at AM   ? losartan (COZAAR) 25 MG tablet Take 25 mg by mouth daily. 7/8/2021 at AM   ? melatonin 3 mg Tab tablet Take 3 mg by mouth at bedtime as needed. If patient needs give prior to 1 AM    ? melatonin 5 mg Tab tablet Take 5 mg by mouth at bedtime. 7/7/2021 at Unknown time   ? methyl salicylate-menthol (ICY HOT) 29-7.6 % Apply 1 application topically 2 (two) times a day as needed.    ? senna-docusate (SENNOSIDES-DOCUSATE SODIUM) 8.6-50 mg tablet Take 1 tablet by mouth daily as needed for constipation.    ? traZODone (DESYREL) 50 MG tablet Take 25 mg by mouth at bedtime. 7/7/2021 at Unknown time   ? vitamin B complex-folic acid (B COMPLEX 1, WITH FOLIC ACID,) 0.4 mg Tab Take 1 tablet by mouth daily. 7/8/2021 at AM       Information source(s): Facility ([facility]/) medication list/MAR: [place]  [phone]  Method of interview communication: N/A    Summary of Changes to PTA Med List  New: All medications entered for first time  Discontinued: none  Changed: none    Patient was asked about OTC/herbal products specifically.  PTA med list reflects this.    In the past week, patient estimated taking medication this percent of the time:  greater than 90%.    Allergies were reviewed, assessed, and updated with the patient.      Patient does not anticipate needing any multi-use medications during admission.    The information provided in this note is only as accurate as the sources available at the time of the update(s).    Thank you for the opportunity to participate in the care of this patient.    Yamileth George, PharmD  7/8/2021 7:40 PM

## 2021-07-09 NOTE — CONSULTS
Consults by Penny Magallon MD at 7/9/2021  1:25 PM     Author: Penny Magallon MD Service: Cardiology Author Type: Physician    Filed: 7/9/2021  1:34 PM Date of Service: 7/9/2021  1:25 PM Status: Signed    : Penny Magallon MD (Physician)     Consult Orders    1. Inpatient consult to Cardiology Reason for Consult? New onset afib with RVR. on diltiazem gtt.; Did you contact the consulting MD? No; Consult priority: Today (routine); Communication for MD: No phone communication necessary for now [[id number]] ordered by Pete Lemon MD at 07/09/21 0904                   Cardiology Consult Note    Thank you, Dr. Lemon, for asking the [facility] Heart Care team to see Raquel NORRIS Kevin in consultation at [facility] to evaluate new onset atrial fibrillation with rapid ventricular response.      Assessment:   1.  New onset atrial fibrillation with rapid ventricular response, etiology unclear although patient reports history of hypertension and hypothyroidism which could be contributing factors.  Also found to have a large right pleural effusion on chest x-ray which may have precipitated event.  At this point would focus on rate control.  She is currently on maximum dose of IV diltiazem.  We will administer a single dose of IV digoxin to see if this helps.  We will also initiate low-dose beta-blocker as this would provide more AV marga blocking effects than the IV diltiazem.  2.  Large right pleural effusion, cause unknown.  BNP within normal limits suggesting this is not from congestive heart failure.  Will need to rule out a malignant cause.  Agree with thoracentesis for both therapeutic and diagnostic benefits.  3.  Essential hypertension, currently controlled     Plan:   1.  Digoxin 0.5 mg IV now, along with metoprolol tartrate 25 mg twice daily holding for systolic blood pressure less than 105.  2.  Echocardiogram once heart rate below 110 to evaluate for any structural heart disease.  3.  Agree with  thoracentesis for both therapeutic and diagnostic benefit.     Current History:   Ms. Raquel Brown is a 89 y.o. female with history of essential hypertension, hypothyroidism but no prior history of heart disease, diabetes or malignancy who was brought from the nursing home today due to complaints of shortness of breath and tachycardia.  Patient was found to be in atrial fibrillation with rapid ventricular response.  ECG without acute ischemic changes and initial troponin negative.  She was given IV diltiazem bolus and initiated on a drip beginning at 5 mics per kilo per minute.  Chest x-ray demonstrated large right pleural effusion but was otherwise unrevealing.  She has now been admitted for possible pneumonia and new onset atrial fibrillation with rapid ventricular response.  Patient denies any sense of rapid heart rate.  Denies any chest pain.  Also reported to me no shortness of breath although states that the nursing staff was concerned about her breathing which prompted her presentation.    Past Medical History:   -Hypertension  -Hypothyroidism    Past Surgical History:   History reviewed. No pertinent surgical history.    Family History:   No pertinent family history of coronary artery disease    Social History:    Currently residing in a nursing home.  Has no children.    Meds:     Current Facility-Administered Medications:   ?  digoxin injection 500 mcg (LANOXIN), 500 mcg, Intravenous, Once, Penny Magallon MD  ?  diltiazem 1 mg/mL in sodium chloride 0.9% 125 mL, 5-15 mg/hr, Intravenous, Continuous, Ohl, Hari Magana DO, Last Rate: 15 mL/hr at 07/09/21 1246, 15 mg/hr at 07/09/21 1246  ?  furosemide tablet 20 mg (LASIX), 20 mg, Oral, DAILY, Pete Lemon MD, 20 mg at 07/09/21 0856  ?  guaiFENesin 100 mg/5 mL syrup 300 mg (ROBITUSSIN), 300 mg, Oral, Q4H PRN, Pete Lemon MD  ?  levothyroxine tablet 75 mcg (SYNTHROID, LEVOTHROID), 75 mcg, Oral, Daily 0600, Pete Lemon MD, 75 mcg at 07/09/21  "0855  ?  melatonin tablet 3 mg, 3 mg, Oral, Bedtime PRN, Pete Lemon MD  ?  melatonin tablet 5 mg, 5 mg, Oral, QHS, Pete Lemon MD  ?  methyl salicylate-menthol ointment 1 application (ICY HOT), 1 application, Topical, BID PRN, Pete Lemon MD  ?  metoprolol tartrate tablet 25 mg (LOPRESSOR), 25 mg, Oral, BID, Penny Magallon MD  ?  potassium chloride ER tablet 10 mEq (K-DUR,KLOR-CON), 10 mEq, Oral, Once, Pete Lemon MD  ?  senna-docusate 8.6-50 mg tablet 1 tablet (PERICOLACE), 1 tablet, Oral, Daily PRN, Pete Lemon MD  ?  [COMPLETED] Insert peripheral IV, , , Once **AND** [COMPLETED] Saline lock IV, , , Once **AND** sodium chloride flush 10 mL (NS), 10 mL, Intravenous, Line Care, Ohl, Hari Magana, DO  ?  traZODone tablet 25 mg (DESYREL), 25 mg, Oral, QHS, Pete Lemon MD    Allergies:   Ace inhibitors, Latex, and Pravastatin    Review of Systems:   A 12 point comprehensive review of systems was negative except as noted in the current history.    Objective:      Physical Exam  203 lb 12.8 oz (92.4 kg)  5' 6\" (1.676 m)  Body mass index is 32.89 kg/m .  BP (!) 131/98   Pulse 100   Temp 97.2  F (36.2  C) (Axillary)   Resp 18   Ht 5' 6\" (1.676 m)   Wt 203 lb 12.8 oz (92.4 kg)   SpO2 99%   BMI 32.89 kg/m      General Appearance:    Well-developed, well-nourished elderly white female who is hard of hearing but in no acute distress   HEENT:   Normocephalic, atraumatic.  Sclera nonicteric.  Mucous membranes appear moist.   Neck:  No jugular venous distention evident at 60 degrees.  No carotid bruits.   Chest:  Symmetric with normal AP diameter   Lungs:   Diffuse end expiratory wheezes on the left.  Markedly diminished breath sounds on the right   Cardiovascular:    Irregularly irregular rhythm.  S1, S2 normal.  No murmur or gallop   Abdomen:   Obese, soft, nondistended, nontender.  No organomegaly or mass.   Extremities:  Bilateral Ace wraps in place.  2+ pitting edema to the mid calves " bilaterally   Skin:  No rash or lesions evident   Neurologic:  Alert and oriented x2.  No gross focal deficits             Cardiographics (personally reviewed)  ECG on admission demonstrates atrial fibrillation with rapid ventricular response and nonspecific ST-T wave abnormality.  No prior ECG for comparison    Imaging (personally reviewed)  Chest x-ray as reported above    Lab Review (personally reviewed all results)  Lab Results   Component Value Date     07/09/2021    K 3.6 07/09/2021    CL 98 07/09/2021    CO2 29 07/09/2021    BUN 29 (H) 07/09/2021    CREATININE 1.15 (H) 07/09/2021    CALCIUM 9.0 07/09/2021     Lab Results   Component Value Date    WBC 12.8 (H) 07/09/2021    HGB 13.8 07/09/2021    HCT 41.1 07/09/2021    MCV 93 07/09/2021     07/09/2021     No results found for: CHOL, TRIG, HDL, LDLCALC  Troponin I   Date Value Ref Range Status   07/08/2021 0.04 0.00 - 0.29 ng/mL Final     BNP   Date Value Ref Range Status   07/08/2021 115 0 - 167 pg/mL Final   10/15/2019 76 0 - 167 pg/mL Final

## 2021-07-09 NOTE — PROGRESS NOTES
Progress Notes by Lary Baires PT at 7/9/2021  1:58 PM     Author: Lary Baires PT Service: -- Author Type: Physical Therapist    Filed: 7/9/2021  1:58 PM Date of Service: 7/9/2021  1:58 PM Status: Signed    : Lary Baires PT (Physical Therapist)       Lymphedema     07/09/21 1300   Visit Specifics   Session Type Eval   Treatment Time   ADL Training 25   Patient Information   Onset date 7/8/21   Chart Reviewed Yes   Precautions   Weight Bearing Status FWB   Home Living   Type of Home SNF   Prior Status   Needs Assistance With Functional mobility   Lymphedema Information   Onset of Swelling  chronic swelling   Prior Lymph Garments No   Skin Integrity   Skin Integrity Site R Below Knee;L Below knee;R Foot;L Foot   R Below the Knee Intact;Dry;Moderate edema   L Below The Knee Intact;Dry;Moderate edema   R Foot Intact;Dry;Moderate edema   L Foot  Intact;Dry;Moderate edema   Skin Check Performed Yes;No concerns   Edema   Edema Site R Below Knee;R Foot;L Below knee;L Foot   R Below Knee 2+   L Below Knee 2+   R Foot 2+   L Foot  2+   Lymphedema Assessment   Treatment Diagnosis Secondary Lymph   Rehab Potential Good   Lymphedema Treatment Plan Bandaging;Patient/caregiver education   Frequency 5-7x/week   Lymph Treatment   Lymphedema Treatment Gradient compression bandaging   Gradient Compression Bandaging (short strech)   Location Bilateral;Below knee;Foot   Compression Level Medium   Details Spiral;25% overlap;Artilex layer;Stockinette base layer   Educated on Indications for Removal and Follow-up Yes   Recommendations   Follow-up Care Home care lymphedema tratment;Will need garment for long-term   Treatment Suggestions for Next Session re-wrap   Interdisciplinary Treatment   PT/OT Therapy Goals Reviewed Yes   PT/OT Lymph Therapy POC Yes

## 2021-07-09 NOTE — H&P
H&P by Pete Lemon MD at 7/8/2021  8:33 PM     Author: Pete Lemon MD Service: Hospitalist Author Type: Physician    Filed: 7/8/2021  8:43 PM Date of Service: 7/8/2021  8:33 PM Status: Signed    : Pete Lemon MD (Physician)       [facility] MEDICINE ADMISSION HISTORY AND PHYSICAL     Assessment & Plan      Raquel Brown is a 89 y.o. old female presents with new onset afib with RVR and left-sided community associated pneumonia.    New Onset Afib  Atrial Fibrillation with Rapid Ventricular Response  -Cardiac telemetry.  -S/p x2 boluses of 10 mg diltiazem; start diltiazem gtt and titrate to goal HR < 110 bpm if not rate-controlled by time of arrival to medical floor/unit.  -Optimize potassium and magnesium. Keep K > 4.0, Mag > 2.0.   -RN potassium and magnesium replacement protocols utilized/ordered.   -Order home medications including AV-marga rate control medication.  -Unclear how long she has had it as she does not feel any palpitations at all.   -Even on 2 L and rates in 120s-130s, she denies any symptoms and no shortness of breath or palpitations.   -If any acute sustained rapid rates develop, obtain EKG to evaluate for RVR and initiate cardiac telemetry and update HMS team.   -Cardiology consultation for brand new-onset atrial fibrillation with RVR.     Community Associated/Acquired Pneumonia   Acute Hypoxic Respiratory Failure  -Obtain sputum culture if able to collect expectorate.   -O2 supplementation as needed to maintain O2 >92%. Wean O2 as tolerated.  -Appreciate RT support and care.  -CXR/CT demonstrated left-sided consolidations, formal read pending.  -Oxygen Baseline: none  -Continue IV ceftriaxone/azithromycin and follow up cultures speciation/sensitivities to tailor antibiotics.  -Utilize benzonatate tessalon pearls, dextromethorphan as needed for suppressing cough symptoms; consider additional as needed and judicious use of codeine and/or guaifenesin  as appropriate.   -Check procalcitonin     OTF  -By chart review, history, physical exam, and labs, most likely etiology is pre-renal in setting of afib with RVR, poor renal perfusion.  -Hold potentially nephrogenic or nephrotoxic medications.   -Rate-control and treat underlying cause  -Cr is in 1.30, and past trends in 1.2-1.3 range so she may have CKD as well - obtain OSH records  -Recheck BMP and Cr tomorrow AM.   -If not improved or normalized, consider further evaluation with FeNa and renal ultrasound.     DVTP: heparin   Code Status: No Order  Disposition: Pending clinical improvement and above milestones for hospitalization   Goals for the hospitalization: As above    Chief Complaint  shortness of breath reported at facility     HISTORY     Raquel Brown is a 89 y.o. old female presents with new onset afib with RVR and left-sided community associated pneumonia.    At her living facility, she apparently reported shortness of breath and vitals were obtained demonstrating tachycardia and hypoxia. She was brought her. She denied any symptoms and was found to have 2 L O2 requirement, EKG with new-onset afib, and HD stable otherwise. It is unclear how long she has had on and off afib with RVR as she does not feel any palpitations at all. During HMS intake, even on 2 L and rates in 120s-130s, she denies any symptoms and no shortness of breath or palpitations at all. No chest pain or tightness. The patient otherwise denies any recent travel domestically or internationally, and also denies any recent sick contacts including any family or friends who have been sick.  When asked, the patient denies any fevers, rigors, chest pain or shortness of breath (at this time now), nausea or vomiting or abdominal pain, changes in bowel movements/pattern, urinary symptoms, focal weakness, or any other new and associated symptoms at this time.  The patient has been compliant with home medications as prescribed. 14 point review  of systems performed with the patient without any other pertinent positives at this time.     The social history, family history, and past medical history were directly reviewed with the patient and corroborated to be accurate as documented below. All questions the patient had at this time were answered to verbalized and stated satisfaction and understanding. Code status was discussed and the patient confirmed wishes for DNR for this hospitalization.    Past Medical History     There are no active non-hospital problems to display for this patient.    History reviewed. No pertinent past medical history.  Patient Active Problem List    Diagnosis Date Noted   ? A-fib (H) 07/08/2021   ? New onset a-fib (H) 07/08/2021   ? Community acquired pneumonia of left lung 07/08/2021        Surgical History     She  has no past surgical history on file.   History reviewed. No pertinent surgical history.  Family History      Reviewed and she denies any known family history of DM, HTN, CA or CAD at this time.     Social History      Reviewed and she denied any illicits, no tobacco history/dependence or EtOH abuse.     Allergies     Allergies   Allergen Reactions   ? Ace Inhibitors    ? Latex    ? Pravastatin        Prior to Admission Medications      (Not in a hospital admission)      Review of Systems     A 12 point comprehensive review of systems was negative except as noted above in HPI.    PHYSICAL EXAMINATION     Vitals      Temp:  [97.8  F (36.6  C)] 97.8  F (36.6  C)  Heart Rate:  [110-190] 110  Resp:  [16-29] 25  BP: (121-138)/() 121/69    Examination     GENERAL:  Alert, appears comfortable, in no acute distress, appears stated age on 2 L   HEAD:  Normocephalic, without obvious abnormality, atraumatic   EYES:  PERRL, conjunctiva/corneas clear, no scleral icterus, EOM's intact   NOSE: Nares normal, septum midline, mucosa normal, no drainage   THROAT: Lips, mucosa, and tongue normal; teeth and gums normal, mouth moist    NECK: Supple, symmetrical, trachea midline   BACK:   Symmetric, no curvature, ROM normal   LUNGS:   Clear to auscultation bilaterally, no rales, rhonchi, or wheezing, symmetric chest rise on inhalation, respirations unlabored   CHEST WALL:  No tenderness or deformity   HEART:  Irregularly irregular rapid rate and rhythm, S1 and S2 normal, no murmur, rub, or gallop    ABDOMEN:   Soft, non-tender, bowel sounds active all four quadrants, no masses, no organomegaly, no rebound or guarding   EXTREMITIES: Extremities normal, atraumatic, no cyanosis, 2+ edema b/l    SKIN: Dry to touch, no exanthems in the visualized areas   NEURO: Alert, oriented x 4, moves all four extremities freely   PSYCH: Cooperative, behavior is appropriate      Pertinent Lab     Personally reviewed.  Results for orders placed or performed during the hospital encounter of 07/08/21   Troponin I   Result Value Ref Range    Troponin I 0.04 0.00 - 0.29 ng/mL   Comprehensive Metabolic Panel   Result Value Ref Range    Sodium 142 136 - 145 mmol/L    Potassium 3.7 3.5 - 5.0 mmol/L    Chloride 97 (L) 98 - 107 mmol/L    CO2 29 22 - 31 mmol/L    Anion Gap, Calculation 16 5 - 18 mmol/L    Glucose 112 70 - 125 mg/dL    BUN 27 8 - 28 mg/dL    Creatinine 1.30 (H) 0.60 - 1.10 mg/dL    GFR MDRD Af Amer 46 (L) >60 mL/min/1.73m2    GFR MDRD Non Af Amer 38 (L) >60 mL/min/1.73m2    Bilirubin, Total 1.1 (H) 0.0 - 1.0 mg/dL    Calcium 9.5 8.5 - 10.5 mg/dL    Protein, Total 7.0 6.0 - 8.0 g/dL    Albumin 3.6 3.5 - 5.0 g/dL    Alkaline Phosphatase 87 45 - 120 U/L    AST 19 0 - 40 U/L    ALT 13 0 - 45 U/L   Magnesium   Result Value Ref Range    Magnesium 1.4 (L) 1.8 - 2.6 mg/dL   BNP(B-type Natriuretic Peptide)   Result Value Ref Range     0 - 167 pg/mL   APTT(PTT)   Result Value Ref Range    PTT 29 24 - 37 seconds   INR   Result Value Ref Range    INR 0.97 0.90 - 1.10   HM1 (CBC with Diff)   Result Value Ref Range    WBC 13.2 (H) 4.0 - 11.0 thou/uL    RBC 4.86 3.80  - 5.40 mill/uL    Hemoglobin 15.3 12.0 - 16.0 g/dL    Hematocrit 45.4 35.0 - 47.0 %    MCV 93 80 - 100 fL    MCH 31.5 27.0 - 34.0 pg    MCHC 33.7 32.0 - 36.0 g/dL    RDW 14.9 (H) 11.0 - 14.5 %    Platelets 395 140 - 440 thou/uL    MPV 8.5 8.5 - 12.5 fL    Neutrophils % 77 (H) 50 - 70 %    Lymphocytes % 15 (L) 20 - 40 %    Monocytes % 7 2 - 10 %    Eosinophils % 0 0 - 6 %    Basophils % 0 0 - 2 %    Immature Granulocyte % 1 (H) <=0 %    Neutrophils Absolute 10.2 (H) 2.0 - 7.7 thou/uL    Lymphocytes Absolute 2.0 0.8 - 4.4 thou/uL    Monocytes Absolute 0.9 0.0 - 0.9 thou/uL    Eosinophils Absolute 0.0 0.0 - 0.4 thou/uL    Basophils Absolute 0.0 0.0 - 0.2 thou/uL    Immature Granulocyte Absolute 0.1 (H) <=0.0 thou/uL         Pertinent Radiology     Radiology Results: Xr Chest 1 View Portable    Result Date: 7/8/2021  EXAM: XR CHEST 1 VIEW PORTABLE LOCATION: [facility] DATE/TIME: 7/8/2021 6:58 PM INDICATION: sob COMPARISON: 4/10/2015     New large left pleural effusion. Right lung clear other than stable small calcified granuloma. Heart size difficult to assess.     EKG Results: Personally reviewed - new onset afib, rate 160s, formal read pending    Advance Care Planning      Anticipated Length of Stay in midnights and medical necessity (including a midnight in the Emergency Department after triage if applicable): 2    Pete Lemon MD  Internal Medicine  Hospitalist  [facility]  Phone: #[phone]

## 2021-07-09 NOTE — PROGRESS NOTES
Progress Notes by Pete Lemon MD at 7/9/2021 12:22 PM     Author: Pete Lemon MD Service: Hospitalist Author Type: Physician    Filed: 7/9/2021  2:11 PM Date of Service: 7/9/2021 12:22 PM Status: Addendum    : Pete Lemon MD (Physician)    Related Notes: Original Note by Pete Lemon MD (Physician) filed at 7/9/2021  2:11 PM         HOSPITAL MEDICINE PROGRESS NOTE     OFELIA García is a 89 yo female with history of hypertension and hypothyroidism who presented to the emergency department on 7/8/21 for new onset shortness of breath. ECG revealed new onset atrial fibrillation with rapid ventricular response and heart rate of 190 bpm, no acute ischemic changes. Negative BNP, negative troponin. No concern for acute coronary syndrome. Denied any chest pain or palpitations.  She was given 10 mg diltiazem boluses x 2 and started on titration of diltiazem drip for further rate control.     Chest x-ray revealed large left pleural effusion. She was started on IV ceftriaxone and azithromycin with concern for possible infection (WBC 13.2).     Vitally stable. Admitted to the floor for cardiac telemetry and further management of new onset atrial fibrillation and left pleural effusion.     Discussed thoracentesis reason/rationale with her and she agreed to this procedure and diagnostic/therapeutic intervention. All questions she had at this time were answered.     Assessment/Plan     Patient is 89 y.o. old female with history of hypertension and hypothyroidism admitted for new onset atrial fibrillation with RVR. Cardiology consulted for new onset afib. US thoracentesis ordered for diagnostic and therapeutic purposes.     Principal Problem:    A-fib (H)  Active Problems:    New onset a-fib (H)    Community acquired pneumonia of left lung    Acute respiratory failure with hypoxia (H)    New-onset atrial fibrillation with rapid ventricular response.   - Cardiology consultation for new onset atrial  fibrillation with RVR.  - Continue diltiazem gtt and titrate to HR <110; plan to titrate off of IV medication and onto oral rate controlling medication as tolerated.  - Digoxin and metoprolol ordered as per cardiology. Recs much appreciated.  - Magnesium and potassium replacement orders in place; goal Mag>2.0 and K>4  - Still on 3L O2 nasal cannula, no shortness of breath or chest tightness    Left pleural effusion.   Probable/possible CAP PNA.  Acute Hypoxic respiratory failure  - Chest XR/CT revealed left pleural effusion.   - Plan ultrasound guided thoracentesis with cytology to determine etiology of pleural effusion.  - US guided thoracentesis ordered, as well as body fluid studies including medical cytology, cell count, gram stain and cultures, and LDH, protein  - Continue IV ceftriaxone/azithromycin.   - O2 supplementation as needed maintaining O2 >92%. Wean as tolerated.   - RT support appreciated.    OTF.   - Most likely pre-renal in the setting of atrial fibrillation with rapid ventricular response (poor renal perfusion).   - Creatinine 1.3 initially, improved to 1.1 today.   - Recheck BMP/cr tomorrow AM.   - Hold nephrotoxic/nephrogenic medications.     DVTP: Heparin     Barriers to discharge: pending consultation with cardiology, appropriate oral rate control management, appropriate oral anticoagulation plan; US guided thoracentesis for evaluation of pleural effusion; clinical improvement including reduction in supplemental oxygen requirement     LOS: 1 day     MEDICATIONS      ? furosemide  20 mg Oral DAILY   ? levothyroxine  75 mcg Oral Daily 0600   ? melatonin  5 mg Oral QHS   ? sodium chloride  10 mL Intravenous Line Care   ? traZODone  25 mg Oral QHS       OBJECTIVE     Vital signs in last 24 hours:   Temp:  [97.1  F (36.2  C)-97.9  F (36.6  C)] 97.2  F (36.2  C)  Heart Rate:  [] 100  Resp:  [16-29] 18  BP: (110-143)/() 131/98  SpO2:  [90 %-100 %] 99 %  Weight:    203 lb 12.8 oz (92.4  kg)  Intake/Output last 3 shifts:   I/O last 3 completed shifts:  In: 150 [IV Piggyback:150]  Out: 125 [Urine:125]  Intake/Output this shift:   I/O this shift:  In: 120 [P.O.:120]  Out: -   Review of Systems:   As per subjective, all others negative.    PHYSICAL EXAMINATION     General Appearance:  Alert, cooperative, no distress, appears stated age;    Head:  Normocephalic, without obvious abnormality, atraumatic   Throat: Lips, mucosa, and tongue normal; teeth and gums normal   Neck: Supple, symmetrical, trachea midline   Lungs:   Clear to auscultation bilaterally anteriorly, respirations unlabored   Chest Wall:  No tenderness or deformity   Heart:  Irregularly rhythm, appropriate rate; no murmur, rub or gallop   Abdomen:   Soft, non-tender, bowel sounds active all four quadrants,  no masses, no organomegaly   Extremities: Extremities atraumatic, no cyanosis; bilateral pitting edema   Skin: Skin color, texture, turgor normal, no rashes or lesions   Neurologic: Alert and oriented x3, Moves all 4 extremities      Cardiographics      I personally reviewed.    Imaging:     Personally Reviewed.  Xr Chest 1 View Portable    Result Date: 7/8/2021  EXAM: XR CHEST 1 VIEW PORTABLE LOCATION: [facility] DATE/TIME: 7/8/2021 6:58 PM INDICATION: sob COMPARISON: 4/10/2015     New large left pleural effusion. Right lung clear other than stable small calcified granuloma. Heart size difficult to assess.      Lab Results:     Personally Reviewed.   Results from last 7 days   Lab Units 07/09/21  0901 07/08/21  1834   LN-WHITE BLOOD CELL COUNT thou/uL 12.8* 13.2*   LN-HEMOGLOBIN g/dL 13.8 15.3   LN-HEMATOCRIT % 41.1 45.4   LN-PLATELET COUNT thou/uL 308 395     Results from last 7 days   Lab Units 07/09/21  0901 07/08/21  1834   LN-SODIUM mmol/L 141 142   LN-POTASSIUM mmol/L 3.6 3.7   LN-CHLORIDE mmol/L 98 97*   LN-CO2 mmol/L 29 29   LN-BLOOD UREA NITROGEN mg/dL 29* 27   LN-CREATININE mg/dL 1.15* 1.30*    LN-CALCIUM mg/dL 9.0 9.5   LN-ALBUMIN g/dL  --  3.6   LN-PROTEIN TOTAL g/dL  --  7.0   LN-BILIRUBIN TOTAL mg/dL  --  1.1*   LN-ALKALINE PHOSPHATASE U/L  --  87   LN-ALT (SGPT) U/L  --  13   LN-AST (SGOT) U/L  --  19     Results from last 7 days   Lab Units 07/08/21  1833   LN-INR  0.97     ADI Shepard  7/9/21      I agree with the documentation as written by ADI Ham and our discussed and formulated assessment and plan.     Pete Lemon MD  Internal Medicine  [facility] Hospitalist  [facility]  Phone: #[phone]

## 2021-07-09 NOTE — PROGRESS NOTES
Progress Notes by Yamileth Jara, RN at 7/9/2021 10:10 AM     Author: Yamileth Jara, RN Service: -- Author Type: Registered Nurse    Filed: 7/9/2021 10:11 AM Date of Service: 7/9/2021 10:10 AM Status: Signed    : Yamileth Jara, RN (Registered Nurse)       Delfino Welch took patients watch, necklace and bracelet home with her today.

## 2021-07-09 NOTE — PLAN OF CARE
Plan of Care by Yamileth Jara, RN at 7/9/2021  6:38 PM     Author: Yamileth Jara, RN Service: -- Author Type: Registered Nurse    Filed: 7/9/2021  6:50 PM Date of Service: 7/9/2021  6:38 PM Status: Signed    : Yamileth Jara RN (Registered Nurse)         Problem: Pain  Goal: Patient's pain/discomfort is manageable  Outcome: Progressing   Denied pain but did moan and grimace when we were repositioning.     Problem: Safety  Goal: Patient will be injury free during hospitalization  Outcome: Progressing  Call light within reach at all times, room near nurse station, room door open, non slip socks on when up ambulating or transfers, hourly rounding. Educated on falls prevention while in hospital.     Problem: Daily Care  Goal: Daily care needs are met  Outcome: Progressing   Repositioned every two hours, lymphedema wraps applied to BLE, pressure sores on both buttocks, mepilex applied to both areas. Pt went down for thoracentesis and 1.3L removed. Site intact. Lung sounds diminished, expiratory wheezes, coarse. Non productive cough at times. Diltizem drip stopped this shift after pt was given one time dose of IV digoxin, heart rate stayed below 100.  Poor appetite, pure wick in place all shift.     Problem: Potential for Compromised Skin Integrity  Goal: Skin integrity is maintained or improved  Outcome: Progressing

## 2021-07-10 PROBLEM — I48.91 ATRIAL FIBRILLATION (H): Status: ACTIVE | Noted: 2021-07-10

## 2021-07-10 LAB
AORTIC ROOT: 3.5 CM
AORTIC VALVE MEAN VELOCITY: 49.2 CM/S
ASCENDING AORTA: 3.7 CM
AV DIMENSIONLESS INDEX VTI: 1
AV MEAN GRADIENT: 1 MMHG
AV PEAK GRADIENT: 2.2 MMHG
AV VALVE AREA: 2.9 CM2
AV VELOCITY RATIO: 1.1
BSA FOR ECHO PROCEDURE: 2.07 M2
CV BLOOD PRESSURE: ABNORMAL MMHG
CV ECHO HEIGHT: 66 IN
CV ECHO WEIGHT: 198 LBS
DOP CALC AO PEAK VEL: 74.3 CM/S
DOP CALC AO VTI: 14.2 CM
DOP CALC LVOT AREA: 2.83 CM2
DOP CALC LVOT DIAMETER: 1.9 CM
DOP CALC LVOT PEAK VEL: 78.1 CM/S
DOP CALC LVOT STROKE VOLUME: 41.7 CM3
DOP CALCLVOT PEAK VEL VTI: 14.7 CM
ECHO EJECTION FRACTION ESTIMATED: 50 %
EJECTION FRACTION: 55 % (ref 55–75)
FRACTIONAL SHORTENING: 38.9 % (ref 28–44)
INTERVENTRICULAR SEPTUM IN END DIASTOLE: 0.9 CM (ref 0.6–0.9)
IVS/PW RATIO: 1
LA AREA 1: 17.1 CM2
LA AREA 2: 13.8 CM2
LEFT ATRIUM LENGTH: 3.9 CM
LEFT ATRIUM SIZE: 3.1 CM
LEFT ATRIUM VOLUME INDEX: 24.8 ML/M2
LEFT ATRIUM VOLUME: 51.4 ML
LEFT VENTRICLE CARDIAC INDEX: 1.9 L/MIN/M2
LEFT VENTRICLE CARDIAC OUTPUT: 3.9 L/MIN
LEFT VENTRICLE DIASTOLIC VOLUME INDEX: 46.9 CM3/M2 (ref 29–61)
LEFT VENTRICLE DIASTOLIC VOLUME: 97.1 CM3 (ref 46–106)
LEFT VENTRICLE HEART RATE: 94 BPM
LEFT VENTRICLE MASS INDEX: 72.6 G/M2
LEFT VENTRICLE SYSTOLIC VOLUME INDEX: 21 CM3/M2 (ref 8–24)
LEFT VENTRICLE SYSTOLIC VOLUME: 43.5 CM3 (ref 14–42)
LEFT VENTRICULAR INTERNAL DIMENSION IN DIASTOLE: 4.81 CM (ref 3.8–5.2)
LEFT VENTRICULAR INTERNAL DIMENSION IN SYSTOLE: 2.94 CM (ref 2.2–3.5)
LEFT VENTRICULAR MASS: 150.3 G
LEFT VENTRICULAR OUTFLOW TRACT MEAN GRADIENT: 1 MMHG
LEFT VENTRICULAR OUTFLOW TRACT MEAN VELOCITY: 51.3 CM/S
LEFT VENTRICULAR OUTFLOW TRACT PEAK GRADIENT: 2 MMHG
LEFT VENTRICULAR POSTERIOR WALL IN END DIASTOLE: 0.91 CM (ref 0.6–0.9)
LV STROKE VOLUME INDEX: 20.1 ML/M2
MAGNESIUM SERPL-MCNC: 1.7 MG/DL (ref 1.8–2.6)
MITRAL VALVE DECELERATION SLOPE: 3720 MM/S2
MITRAL VALVE PRESSURE HALF-TIME: 75 MS
MV AVERAGE E/E' RATIO: 11.2 CM/S
MV DECELERATION TIME: 256 MS
MV E'TISSUE VEL-LAT: 8.92 CM/S
MV E'TISSUE VEL-MED: 7.94 CM/S
MV LATERAL E/E' RATIO: 10.6
MV MEDIAL E/E' RATIO: 11.9
MV PEAK E VELOCITY: 94.6 CM/S
MV VALVE AREA PRESSURE 1/2 METHOD: 2.9 CM2
NUC REST DIASTOLIC VOLUME INDEX: 3169.6 LBS
NUC REST SYSTOLIC VOLUME INDEX: 66 IN
POTASSIUM BLD-SCNC: 3.8 MMOL/L (ref 3.5–5)
PR MAX PG: 5 MMHG
PR PEAK VELOCITY: 111 CM/S
PROT SERPL-MCNC: 6 G/DL (ref 6–8)
PV ACCELERATION TIME: 92 MS
TRICUSPID REGURGITATION PEAK PRESSURE GRADIENT: 8.1 MMHG
TRICUSPID VALVE ANULAR PLANE SYSTOLIC EXCURSION: 1.9 CM
TRICUSPID VALVE PEAK REGURGITANT VELOCITY: 142 CM/S

## 2021-07-10 PROCEDURE — 999N001212 HC REV CODE 9999 CHARGE CONVERSION OPNP

## 2021-07-10 PROCEDURE — 83735 ASSAY OF MAGNESIUM: CPT

## 2021-07-10 PROCEDURE — C8929 TTE W OR WO FOL WCON,DOPPLER: HCPCS

## 2021-07-10 PROCEDURE — 84132 ASSAY OF SERUM POTASSIUM: CPT

## 2021-07-10 PROCEDURE — 36415 COLL VENOUS BLD VENIPUNCTURE: CPT

## 2021-07-10 PROCEDURE — 97535 SELF CARE MNGMENT TRAINING: CPT | Mod: GP

## 2021-07-10 PROCEDURE — 84155 ASSAY OF PROTEIN SERUM: CPT

## 2021-07-10 RX ORDER — TRAZODONE HYDROCHLORIDE 50 MG/1
25 TABLET, FILM COATED ORAL AT BEDTIME
COMMUNITY

## 2021-07-10 RX ORDER — FUROSEMIDE 20 MG
20 TABLET ORAL DAILY
Status: DISCONTINUED | OUTPATIENT
Start: 2021-07-11 | End: 2021-07-12 | Stop reason: HOSPADM

## 2021-07-10 RX ORDER — LEVOTHYROXINE SODIUM 75 UG/1
75 TABLET ORAL DAILY
COMMUNITY

## 2021-07-10 RX ORDER — FUROSEMIDE 20 MG
20 TABLET ORAL DAILY
COMMUNITY

## 2021-07-10 RX ORDER — METOPROLOL TARTRATE 25 MG/1
25 TABLET, FILM COATED ORAL 2 TIMES DAILY
Status: DISCONTINUED | OUTPATIENT
Start: 2021-07-11 | End: 2021-07-12 | Stop reason: HOSPADM

## 2021-07-10 RX ORDER — LOSARTAN POTASSIUM 25 MG/1
25 TABLET ORAL DAILY
COMMUNITY

## 2021-07-10 RX ORDER — MENTHOL AND METHYL SALICYLATE 7.6; 29 G/100G; G/100G
OINTMENT TOPICAL 2 TIMES DAILY PRN
COMMUNITY

## 2021-07-10 RX ORDER — AMOXICILLIN 250 MG
1 CAPSULE ORAL DAILY PRN
Status: DISCONTINUED | OUTPATIENT
Start: 2021-07-11 | End: 2021-07-12 | Stop reason: HOSPADM

## 2021-07-10 RX ORDER — LANOLIN ALCOHOL/MO/W.PET/CERES
3 CREAM (GRAM) TOPICAL
Status: DISCONTINUED | OUTPATIENT
Start: 2021-07-11 | End: 2021-07-12 | Stop reason: HOSPADM

## 2021-07-10 RX ORDER — LEVOTHYROXINE SODIUM 75 UG/1
75 TABLET ORAL DAILY
Status: DISCONTINUED | OUTPATIENT
Start: 2021-07-11 | End: 2021-07-12 | Stop reason: HOSPADM

## 2021-07-10 RX ORDER — MENTHOL AND METHYL SALICYLATE 7.6; 29 G/100G; G/100G
OINTMENT TOPICAL 2 TIMES DAILY PRN
Status: DISCONTINUED | OUTPATIENT
Start: 2021-07-11 | End: 2021-07-12 | Stop reason: HOSPADM

## 2021-07-10 RX ORDER — LANOLIN ALCOHOL/MO/W.PET/CERES
3 CREAM (GRAM) TOPICAL
COMMUNITY

## 2021-07-10 RX ORDER — MAGNESIUM OXIDE 400 MG/1
400 TABLET ORAL 2 TIMES DAILY
Status: COMPLETED | OUTPATIENT
Start: 2021-07-11 | End: 2021-07-11

## 2021-07-10 RX ORDER — AMOXICILLIN 250 MG
1 CAPSULE ORAL DAILY PRN
COMMUNITY

## 2021-07-10 RX ORDER — SENNOSIDES 8.6 MG
1300 CAPSULE ORAL EVERY EVENING
COMMUNITY

## 2021-07-10 RX ORDER — METHACHOLINE/METHYL SALICYLATE 0.25 %
1 OINTMENT (GRAM) TOPICAL 2 TIMES DAILY PRN
Status: ON HOLD | COMMUNITY
End: 2021-07-10

## 2021-07-10 RX ORDER — ASCORBIC ACID 500 MG
500 TABLET ORAL DAILY
COMMUNITY

## 2021-07-10 ASSESSMENT — MIFFLIN-ST. JEOR
SCORE: 1310.33
SCORE: 1310.33

## 2021-07-10 NOTE — PLAN OF CARE
Plan of Care by Britt Monaco RN at 7/10/2021  5:00 AM     Author: Britt Monaco RN Service: -- Author Type: Registered Nurse    Filed: 7/10/2021  5:03 AM Date of Service: 7/10/2021  5:00 AM Status: Signed    : Britt Monaco RN (Registered Nurse)         Problem: Pain  Goal: Patient's pain/discomfort is manageable  Outcome: Progressing  Denies pain or discomfort.  No prn medications given.     Problem: Urinary Incontinence  Goal: Perineal skin integrity is maintained or improved  Outcome: Progressing  Skin integrity maintained through use of purewick, as patient is incontinent of urine.  Patient also turned q2h.     Problem: Potential for Falls  Goal: Patient will remain free of falls  Outcome: Progressing  Remains free from falls through hourly rounding, bed alarm, bed in low position with wheels locked in place, non-slip footwear, close observation, and call light in reach.    All cares performed as ordered and explained.  Will continue to monitor.    Britt Monaco RN

## 2021-07-10 NOTE — PROGRESS NOTES
Progress Notes by Lary Baires PT at 7/10/2021  4:12 PM     Author: Lary Baires PT Service: -- Author Type: Physical Therapist    Filed: 7/10/2021  4:12 PM Date of Service: 7/10/2021  4:12 PM Status: Signed    : Lary Baires PT (Physical Therapist)       Physical Therapy     07/10/21 1015   Visit Specifics   Session Type Treatment   Treatment Time   ADL Training 43   Skin Integrity   R Below the Knee Dry;Mild edema   L Below The Knee Dry;Mild edema   R Foot Dry;Moderate edema   L Foot  Dry;Moderate edema   Skin Check Performed Yes   Comments blanchable redness on bilat dorsum of foot, RN informed and saw legs, no concerns   Edema   R Below Knee 2+   L Below Knee 2+   R Foot 2+   L Foot  2+   Gradient Compression Bandaging (short strech)   Location Bilateral;Below knee;Foot   Compression Level Medium;Light   Details Spiral;25% overlap;Artilex layer;Stockinette base layer;Foam added (Comment)   Educated on Indications for Removal and Follow-up Yes   Comments foam added to dorsum of feet due to redness as noted above, will re-assess tomorrow, lotion applied to bilat LEs, increased time due to soreness with lifting legs   Recommendations   Follow-up Care Home care lymphedema tratment   Treatment Suggestions for Next Session re-wrap vs tubigrip   Interdisciplinary Treatment   PT/OT Therapy Goals Reviewed Yes   PT/OT Lymph Therapy POC Yes

## 2021-07-10 NOTE — PLAN OF CARE
Plan of Care by Yamileth Jara, RN at 7/10/2021  2:53 PM     Author: Yamileth Jara, RN Service: -- Author Type: Registered Nurse    Filed: 7/10/2021  3:00 PM Date of Service: 7/10/2021  2:53 PM Status: Signed    : Yamileth Jara RN (Registered Nurse)         Problem: Pain  Goal: Patient's pain/discomfort is manageable  Outcome: Progressing   Denied pain when asked this shift. Frequently asked pt if she was having pain or in any discomfort and told her to let staff know if having any discomfort.    Problem: Safety  Goal: Patient will be injury free during hospitalization  Outcome: Progressing  Call light within reach at all times, room near nurse station, room door open, non slip socks on when up ambulating or transfers, hourly rounding. Educated on falls prevention while in hospital. Bed alarm, ceiling lift for transfers.      Problem: Daily Care  Goal: Daily care needs are met  Outcome: Progressing   Appetite poor, repositioned every 2 hours for comfort with pillow support.  Total care pt with personal hygiene tasks. Stage 1 pressure sores on both buttocks. Mepilex dressing in place. Lymphedema wraps BLE changed today by OT.  Lung sounds diminished with exp wheezes and cough. PRN PO cough medication x1 given.     Problem: Urinary Incontinence  Goal: Perineal skin integrity is maintained or improved  Outcome: Progressing   Pure wick in place all shift. Good urine output.     [place]ie[place] are in pt room with [facility] at time of this writing discussing end of life goals.

## 2021-07-10 NOTE — PROGRESS NOTES
Progress Notes by Pete Lemon MD at 7/10/2021  7:29 AM     Author: Pete Lemon MD Service: Hospitalist Author Type: Physician    Filed: 7/10/2021 11:32 AM Date of Service: 7/10/2021  7:29 AM Status: Signed    : Pete Lemon MD (Physician)       [facility] MEDICINE PROGRESS NOTE      Identification/Summary: Raquel Brown is a 89 y.o. old female with history of hypertension and hypothyroidism admitted for new onset atrial fibrillation with RVR. Cardiology consulted for new onset afib. US thoracentesis ordered for diagnostic and therapeutic purposes. Initial pleural fluid studies demonstrate a transudative process, with medical cytology still pending. Remains on 3 L of O2; starting IS as well. TTE ordered now that HR is < 110 consistently.     Assessment and Plan:  New-onset atrial fibrillation with rapid ventricular response.   - Cardiology consultation for new onset atrial fibrillation with RVR.  - Continue diltiazem gtt and titrate to HR <110; plan to titrate off of IV medication and onto oral rate controlling medication as tolerated.  - Digoxin and metoprolol ordered as per cardiology. Recs much appreciated.  - Magnesium and potassium replacement orders in place; goal Mag>2.0 and K>4  - Still on 3L O2 nasal cannula, no shortness of breath or chest tightness  - TTE ordered now that HR is < 110. Follow-up results.  - Agree with cardiology on starting anticoagulation if consistent with patient's goals of care; would favor DOAC and apixaban (coverage and first month free with coupon if needed at [facility] pharmacy).      Left pleural effusion.   Probable/possible CAP PNA.  Acute Hypoxic respiratory failure  - Chest XR/CT revealed left pleural effusion.   - S/p ultrasound guided thoracentesis with cytology on 7/9 to determine etiology of pleural effusion.  - Initial pleural fluid studies demonstrate a transudative process, with medical cytology still pending. Follow-up remaining  studies still pending.  - Discontinue IV ceftriaxone/azithromycin; procalcitonin not elevated and initial findings in ED consistent with pleural effusions which has been drained. Continue to monitor closely.   - O2 supplementation as needed maintaining O2 >92%. Wean as tolerated.   - RT support appreciated.  - IS     OTF.   - Most likely pre-renal in the setting of atrial fibrillation with rapid ventricular response (poor renal perfusion).   - Creatinine 1.3 initially, improved to 1.1 today.   - At baseline now.  - Hold nephrotoxic/nephrogenic medications. May consider restarting losartan later today if hypertensive vs tomorrow. SBP this Am in 100's.     DVTP: Heparin       Diet: Diet Regular  Code Status: No CPR- Do NOT Intubate    Anticipated possible discharge in 1-2 days to likely back to [facility] - milestones for discharge: finalized rate-control oral agent dosing/frequency, appropriate oral anticoagulation plan; US pleural fluid cytology; clinical improvement including reduction in supplemental oxygen requirement with O2 weaning.    Interval History/Subjective:  NAEO.     No chest pain or shortness of breath. Still on 3 L. She subjectively feels her breathing has improved after 1.3 L removed; however, still on 3 L, higher than 2 L yesterday. No other new complaints.     Physical Exam/Objective:  Vitals I/O   Temp:  [97.2  F (36.2  C)-98.6  F (37  C)] 97.3  F (36.3  C)  Heart Rate:  [] 98  Resp:  [18-20] 20  BP: (107-137)/(56-98) 107/66  SpO2:  [93 %-99 %] 93 %  I/O last 3 completed shifts:  In: 600 [P.O.:600]  Out: 250 [Urine:250]   198 lb 1.6 oz (89.9 kg)  Body mass index is 31.97 kg/m .    GENERAL:  Alert, appears comfortable, in no acute distress, appears stated age   HEAD:  Normocephalic, without obvious abnormality, atraumatic   EYES:  PERRL, conjunctiva/corneas clear, no scleral icterus, EOM's intact   NOSE: Nares normal, septum midline, mucosa normal, no drainage   THROAT: Lips, mucosa, and tongue  normal; teeth and gums normal, mouth moist   NECK: Supple, symmetrical, trachea midline   BACK:   Symmetric, no curvature, ROM normal   LUNGS:   Clear to auscultation bilaterally, no rales, rhonchi, or wheezing, symmetric chest rise on inhalation, respirations unlabored   CHEST WALL:  No tenderness or deformity   HEART:  Irregular rate and rhythm, S1 and S2 normal, no murmur, rub, or gallop    ABDOMEN:   Soft, non-tender, bowel sounds active all four quadrants, no masses, no organomegaly, no rebound or guarding   EXTREMITIES: Extremities normal, atraumatic, no cyanosis or edema    SKIN: Dry to touch, no exanthems in the visualized areas   NEURO: Alert, oriented x3, moves all four extremities freely   PSYCH: Cooperative, behavior is appropriate      Medications:   Personally Reviewed.  ? furosemide  20 mg Oral DAILY   ? levothyroxine  75 mcg Oral Daily 0600   ? magnesium oxide  400 mg Oral BID   ? melatonin  5 mg Oral QHS   ? metoprolol tartrate  25 mg Oral BID   ? potassium chloride ER  10 mEq Oral Once   ? sodium chloride  10 mL Intravenous Line Care   ? traZODone  25 mg Oral QHS       Data reviewed today: I personally reviewed all new medications, labs, imaging/diagnostics reports over the past 24 hours. Pertinent findings include:  Labs:  Serum   Pleural     Medical Cytology pending     Imaging:  Imaging results 30 days: Xr Chest 1 View Portable    Result Date: 7/8/2021  EXAM: XR CHEST 1 VIEW PORTABLE LOCATION: [facility] DATE/TIME: 7/8/2021 6:58 PM INDICATION: sob COMPARISON: 4/10/2015     New large left pleural effusion. Right lung clear other than stable small calcified granuloma. Heart size difficult to assess.    Us Thoracentesis    Result Date: 7/9/2021  EXAM: 1. LEFT THORACENTESIS 2. ULTRASOUND GUIDANCE LOCATION: [facility] DATE/TIME: 7/9/2021 4:07 PM INDICATION: Pleural effusion. PROCEDURE: Informed consent obtained. Time out performed. The  chest was prepped and draped in sterile fashion. 15 mL of 1 % lidocaine was infused into the local soft tissues. Under direct ultrasound guidance, a 5 Frisian catheter system was placed into the pleural effusion. 2 separate punctures were made. The first drainage site yielded only 300 mL's, stopping after patient movement. 1.3 liters of clear yellow fluid were removed and sent to lab, if requested. Patient tolerated procedure well. Ultrasound imaging was obtained and placed in the patient's permanent medical record.     Status post left ultrasound-guided thoracentesis. Reference CPT Code: 88340      EKG: Personally reviewed.      Pete Lemon  [facility]  Phone: #[phone]

## 2021-07-10 NOTE — PROGRESS NOTES
Progress Notes by Tyrese George MD at 7/10/2021  9:15 AM     Author: Tyrese George MD Service: Cardiology Author Type: Physician    Filed: 7/10/2021  9:19 AM Date of Service: 7/10/2021  9:15 AM Status: Signed    : Tyrese George MD (Physician)       [facility] Progress Note    Assessment: Patient with new atrial fibrillation, with rapid ventricular response and also associated low magnesium and pleural effusion of undetermined etiology.  Significant amount of fluid is removed from the pleural space and her breathing is quite comfortable today.  Her ventricular spies atrial fibrillation continues to be rapid.    She was just started on beta-blocker yesterday so I would allow that to work at least another 24 hours before changing the dose.    She does have a elevated chads 2 vascular score getting 2 points for age, one-point for gender, and one-point for hypertension.  We will ask pharmacy to calculated dose of Eliquis for her but I suspect it will be 2.5 mg twice daily.  She is essentially bedbound so not at risk for falls.    Will get echocardiogram today to evaluate left ventricular systolic function as well.    Further recommendations to follow.  I have not started anticoagulation but will ask the hospital service to weigh in as well.    Thank you for allowing us to participate in her care.        Primary Cardiologist: Penny Magallon    Subjective:    Patient who was admitted for shortness of breath and found to be in atrial fibrillation with rapid ventricular response and large pleural effusion.  The pleural effusion was drained yesterday with a total of 1.3 L taken out.  Her breathing is significantly improved.  She was given 1 dose of digoxin and also a beta-blocker on top of a calcium channel blocker to control the ventricular response.  Ventricular response is in the low 100 range currently.  Her breathing is much better.  She denies chest discomfort.  She gets some aches and  pains from positioning but otherwise is in no pain currently.  Length of Stay:   LOS: 2 days   Problem List:  Principal Problem:    A-fib (H)  Active Problems:    Atrial fibrillation with rapid ventricular response (H)    Community acquired pneumonia of left lung    Acute respiratory failure with hypoxia (H)    Large pleural effusion    Essential hypertension, benign    Hypothyroidism, unspecified type    Med List:  Current Facility-Administered Medications   Medication Dose Route Frequency Provider Last Rate Last Admin   ? diltiazem 1 mg/mL in sodium chloride 0.9% 125 mL  5-15 mg/hr Intravenous Continuous OhHari angela DO   Stopped at 07/09/21 1500   ? furosemide tablet 20 mg (LASIX)  20 mg Oral DAILY Pete Lemon MD   20 mg at 07/10/21 0853   ? guaiFENesin 100 mg/5 mL syrup 300 mg (ROBITUSSIN)  300 mg Oral Q4H PRN Pete Lemon MD   300 mg at 07/10/21 0854   ? levothyroxine tablet 75 mcg (SYNTHROID, LEVOTHROID)  75 mcg Oral Daily 0600 Pete Lemon MD   75 mcg at 07/10/21 0532   ? magnesium oxide tablet 400 mg (MAG-OX)  400 mg Oral BID Pete Lemon MD   400 mg at 07/10/21 0852   ? melatonin tablet 3 mg  3 mg Oral Bedtime PRN Pete Lemon MD       ? melatonin tablet 5 mg  5 mg Oral QHS Pete Lemon MD   5 mg at 07/09/21 2006   ? methyl salicylate-menthol ointment 1 application (ICY HOT)  1 application Topical BID PRN Pete Lemon MD       ? metoprolol tartrate tablet 25 mg (LOPRESSOR)  25 mg Oral BID Penny Magallon MD   25 mg at 07/10/21 0853   ? senna-docusate 8.6-50 mg tablet 1 tablet (PERICOLACE)  1 tablet Oral Daily PRN Pete Lemon MD       ? sodium chloride flush 10 mL (NS)  10 mL Intravenous Line Care OhlHari DO   10 mL at 07/10/21 0854   ? traZODone tablet 25 mg (DESYREL)  25 mg Oral QHS Pete Lemon MD   25 mg at 07/09/21 2230       Objective:   Vital signs in last 24 hours:  Temp:  [97.3  F (36.3  C)-98.6  F (37  C)] 98.2  F (36.8  C)  Heart Rate:  []  94  Resp:  [18-20] 20  BP: (107-132)/(56-74) 120/64  Weight:   198 lb 1.6 oz (89.9 kg)   Weight change: -10 lb 14.4 oz (-4.944 kg)    Physical Exam:  General: Alert, cooperative and in no acute distress.  Very hard of hearing  HEENT: No scleral icterus. No Xanthelasma. Oral mucuos membranes pink and moist.  Neck: JVP 7 cm. No Hepatojugular reflux. Thyroid not visualized  Chest: clear anteriorly  Abd: Nontender. BS+  Cor: no murmurs, rubs or gallops  Ext: Lower extremities are wrapped    Cardiographics:     Heart rate on monitor is 100 220 with atrial fibrillation.  No bradycardia was noted in the night.  Imaging:        Lab Results:    Recent Results (from the past 24 hour(s))   Lactate Dehydrogenase (LDH)    Collection Time: 07/09/21  1:37 PM   Result Value Ref Range    LD (LDH) 221 (H) 125 - 220 U/L   TSH    Collection Time: 07/09/21  1:37 PM   Result Value Ref Range    TSH 2.97 0.30 - 5.00 uIU/mL   Protein, Body Fluid    Collection Time: 07/09/21  3:48 PM   Result Value Ref Range    Protein, Fluid 3.4 g/dL   Cell Ct/Diff, Body Fluid    Collection Time: 07/09/21  3:49 PM   Result Value Ref Range    Color, Fluid Yellow     Appearance, Fluid Clear     WBC, Fluid 425 (H) 0 - 99 /uL    RBC, Fluid <50,000 <50,000 /ul    Neutrophil % 2 <=25 %    Lymphocyte % 79 (H) <=78 %    Monocyte % 13 <=71 %    Macrophage %      Mesothelial % 6 (H) <=1 %    Eosinophil %      Other Cells %     Lactate Dehydrogenase (LDH), Body Fluid    Collection Time: 07/09/21  3:49 PM   Result Value Ref Range    LD, Fluid 104 U/L   Potassium - Next AM    Collection Time: 07/10/21  5:53 AM   Result Value Ref Range    Potassium 3.8 3.5 - 5.0 mmol/L   Magnesium    Collection Time: 07/10/21  5:53 AM   Result Value Ref Range    Magnesium 1.7 (L) 1.8 - 2.6 mg/dL   Protein, Total    Collection Time: 07/10/21  5:53 AM   Result Value Ref Range    Protein, Total 6.0 6.0 - 8.0 g/dL   Echo Complete    Collection Time: 07/10/21  9:01 AM   Result Value Ref Range     LV volume diastolic 97.1 46.0 - 106.0 cm3    LV volume systolic 43.5 (!) 14.0 - 42.0 cm3    HR 94 bpm    IVSd 0.904 (!) 0.6 - 0.9 cm    LVIDd 4.81 3.8 - 5.2 cm    LVIDs 2.94 2.2 - 3.5 cm    LVOT diam 1.9 cm    LVOT mean gradient 1 mmHg    LVOT peak VTI 14.7 cm    LVOT mean kit 51.3 cm/s    LVOT peak kit 78.1 cm/s    LVOT peak gradient 2 mmHg    LV PWd 0.914 (!) 0.6 - 0.9 cm    MV E' lat kit 8.92 cm/s    MV E' med kit 7.94 cm/s    AV mean kit 49.2 cm/s    AV mean gradient 1 mmHg    AV VTI 14.2 cm    AV peak kti 74.3 cm/s    AO root 3.5 cm    LA size 3.1 cm    LA length 3.9 cm    MV decel slope 3,720 mm/s2    MV decel time 256 ms    MV P 1/2 time 75 ms    MV peak E kit 94.6 cm/s    NV peak kit 111 cm/s    NV peak gradient 5 mmHg    PV accel time 92 ms    TR peak kit 142 cm/s    LA area 2 13.8 cm2    LA area 1 17.1 cm2    BSA 2.07 m2    Hieght 66 in    Weight 3,169.6 lbs    /64 mmHg    IVS/PW ratio 1.0     TR peak gradent 8.1 mmHg    LV FS 38.9 28.0 - 44.0 %    Echo LVEF calculated 55 55 - 75 %    LA volume 51.4 mL    AO ascending 3.7 cm    LV mass 150.3 g    AV area 2.9 cm2    AV DIM IND kit 1.1     MV area p 1/2 time 2.9 cm2    LVOT area 2.83 cm2    LVOT SV 41.7 cm3    AV peak gradient 2.2 mmHg    LV systolic volume index 21.0 8.0 - 24.0 cm3/m2    LV diastolic volume index 46.9 29.0 - 61.0 cm3/m2    LA volume index 24.8 mL/m2    LV mass index 72.6 g/m2    LV SVi 20.1 ml/m2    TAPSE 1.9 cm    MV med E/e' ratio 11.9     MV lat E/e' ratio 10.6     LV CO 3.9 l/min    LV Ci 1.9 l/min/m2    Height 66.0 in    Weight 198 lbs    MV Avg E/e' Ratio 11.2 cm/s    AV DIM IND VTI 1.0          Tyrese George MD  Answering Service 24/7 at [phone]

## 2021-07-10 NOTE — PROGRESS NOTES
"Progress Notes by Yamileth Norwood, RN at 7/10/2021  8:07 AM     Author: Yamileth Norwood, RN Service: -- Author Type: RN, Care Manager    Filed: 7/10/2021  1:57 PM Date of Service: 7/10/2021  8:07 AM Status: Addendum    : Yamileth Norwood RN (RN, Care Manager)    Related Notes: Original Note by Yamileth Norwood, RN (RN, Care Manager) filed at 7/10/2021  8:10 AM       Care Management Follow Up Note    Length of Stay (days) 2     Patient plan of care discussed at Interdisciplinary Rounds: yes    Expected Discharge Date:   7/12/21    Concerns to be Addressed / Barriers to Discharge: Attending physician declines d/c, Medication barriers, SNF availability     Anticipated Discharge Disposition:   assisted living    Anticipated Discharge Services:      Selected Continued Care - Admitted Since 7/8/2021     Destination Coordination complete    Service Provider Selected Services Address Phone Fax Patient Preferred Last Updated    [facility]  Assisted Living [address] [facility] [address] [phone] -- Johana Nelson, [facility] 7/9/2021 1548       Internal Comment last updated by Bhavani Esposito RN 7/9/2021 1650    Per Lary, patient resides in [facility] long-term.                            Plan:  7/10/2021  Chart assessment completed per CM protocol. Noted \"Lary\" from [facility] assisted living, per CM note yesterday 7/9, reports patient lives in \"enhanced care\" at their assisted living. Unclear what is meant by \"enhanced\" or what Activities of daily living (ADLs) patient receives assist with.    Attempted to complete assessment with patient but unavailable. Called and left message for admissions @ [facility] re: complete CM assessment and update re: potential discharge date.  Requested return call to [phone].     Received call from Trae, nurse with [facility]. Reviewed chart and no note or order found for hospice consult. Per Trae, she had received a call from patient's niece requesting " "a meeting. No return call yet from assisted living ([facility]) to clarify \"enhanced\" or what services patient currently receives.       CM will continue to monitor progression of care, team recommendations and provide discharge planning assist as needed.          "

## 2021-07-10 NOTE — PROGRESS NOTES
Progress Notes by Trae Del Cid at 7/10/2021  4:03 PM     Author: Trae Del Cid Service: Hospice Author Type: --    Filed: 7/10/2021  4:10 PM Date of Service: 7/10/2021  4:03 PM Status: Signed    : Trae Del Cid       Pt's gabriel Buenrostro contacted [facility] hospice to request informational meeting in pt's room.  Met with Chitra, her sister, and pt Raquel to review hospice benefit and philosophy.  Chitra described pt's recent decline as having a poor appetite, refusing cares, and loss of interest in hobbies and activities.  Pt now with new afib RVR, O2 needs and s/p R thoracentesis.  Pt expressed that she does not want to return to the hospital, and stated interest in having hospice visit her at her AL, to keep her comfortable until she were to pass away.  Reviewed pt with hospice MD, Dr. Kurt Cortés.  He states pt meets hospice criteria with primary diagnosis of acute hypoxic respiratory failure, and related diagnosis of afib RVR, R pleural effusion, stage 1 PU to buttocks and HTN.      Updated bedside nurse Yamileth, who states she did page Dr. Lemon to request a hospice consult order.  Writer called to update Yamileth, care coordinator RN also that pt is interested in and qualifies for hospice.  Would appreciate help of care coordinator to assist in discharge planning and coordinate with [facility] hospice about timing of discharge and equipment needs (would need O2 set up at discharge).  Gabriel Buenrostro and pt are both aware that pt would qualify for hospice if she would like to enroll upon discharge.    Will await updates from the MD and care manager, about discharge plans.  Hospice will continue to follow this hospitalization.    Thank you for this consult,  Trae Del Cid, RN  [facility] Clinical Liason  [phone]

## 2021-07-10 NOTE — PROGRESS NOTES
Information for this encounter is contained in 2 separate electronic health records.  This is due to a system change that occurred between July 10-11, 2021.  Please refer to both EHR's for the complete record.

## 2021-07-11 PROBLEM — J96.01 ACUTE RESPIRATORY FAILURE WITH HYPOXIA (H): Status: ACTIVE | Noted: 2021-07-08

## 2021-07-11 PROBLEM — J18.9 COMMUNITY ACQUIRED PNEUMONIA OF LEFT LUNG: Status: ACTIVE | Noted: 2021-07-08

## 2021-07-11 PROBLEM — I48.91 ATRIAL FIBRILLATION WITH RAPID VENTRICULAR RESPONSE (H): Status: ACTIVE | Noted: 2021-07-08

## 2021-07-11 PROBLEM — I48.91 A-FIB (H): Status: ACTIVE | Noted: 2021-07-08

## 2021-07-11 LAB
CREAT SERPL-MCNC: 0.92 MG/DL (ref 0.6–1.1)
GFR SERPL CREATININE-BSD FRML MDRD: 53 ML/MIN/1.73M2
MAGNESIUM SERPL-MCNC: 1.6 MG/DL (ref 1.8–2.6)
POTASSIUM BLD-SCNC: 3.9 MMOL/L (ref 3.5–5)

## 2021-07-11 PROCEDURE — 200N000001 HC R&B ICU

## 2021-07-11 PROCEDURE — 99233 SBSQ HOSP IP/OBS HIGH 50: CPT | Performed by: HOSPITALIST

## 2021-07-11 PROCEDURE — 250N000013 HC RX MED GY IP 250 OP 250 PS 637

## 2021-07-11 PROCEDURE — 82565 ASSAY OF CREATININE: CPT | Performed by: STUDENT IN AN ORGANIZED HEALTH CARE EDUCATION/TRAINING PROGRAM

## 2021-07-11 PROCEDURE — 83735 ASSAY OF MAGNESIUM: CPT | Performed by: HOSPITALIST

## 2021-07-11 PROCEDURE — 36415 COLL VENOUS BLD VENIPUNCTURE: CPT | Performed by: HOSPITALIST

## 2021-07-11 PROCEDURE — 99231 SBSQ HOSP IP/OBS SF/LOW 25: CPT | Performed by: INTERNAL MEDICINE

## 2021-07-11 PROCEDURE — 84132 ASSAY OF SERUM POTASSIUM: CPT | Performed by: HOSPITALIST

## 2021-07-11 RX ADMIN — Medication 3 MG: at 23:44

## 2021-07-11 RX ADMIN — MAGNESIUM OXIDE TAB 400 MG (241.3 MG ELEMENTAL MG) 400 MG: 400 (241.3 MG) TAB at 09:39

## 2021-07-11 RX ADMIN — MAGNESIUM OXIDE TAB 400 MG (241.3 MG ELEMENTAL MG) 400 MG: 400 (241.3 MG) TAB at 21:10

## 2021-07-11 RX ADMIN — METOPROLOL TARTRATE 25 MG: 25 TABLET, FILM COATED ORAL at 09:39

## 2021-07-11 RX ADMIN — Medication 5 MG: at 21:10

## 2021-07-11 RX ADMIN — METOPROLOL TARTRATE 25 MG: 25 TABLET, FILM COATED ORAL at 21:10

## 2021-07-11 RX ADMIN — TRAZODONE HYDROCHLORIDE 25 MG: 50 TABLET ORAL at 21:10

## 2021-07-11 RX ADMIN — FUROSEMIDE 20 MG: 20 TABLET ORAL at 09:39

## 2021-07-11 ASSESSMENT — ACTIVITIES OF DAILY LIVING (ADL)
DEPENDENT_IADLS:: CLEANING;COOKING;LAUNDRY;MEAL PREPARATION;MEDICATION MANAGEMENT;MONEY MANAGEMENT;TRANSPORTATION;INCONTINENCE

## 2021-07-11 ASSESSMENT — MIFFLIN-ST. JEOR: SCORE: 1298.08

## 2021-07-11 NOTE — PROGRESS NOTES
Writer reviewed chart, spoke to Dr. Lemon about hospice meeting yesterday.  Pt did wean down to 1 L NC and may be able to wean off of O2.  Updated hospice MD Dr. Cortés on pt status.  Based on interval improvement, hospice MD would prefer pt is reassessed back at her care facility for hospice eligibility.  Updated Dr. Lemon that hospice admission is pending her reevaluation in the commmunity.  Dr Lemon is in agreement with this plan.  Updated Bhavani, care coordinator.  Attempted to call gabriel Buenrostro, but she was not available and VM box was full.    Hospice liason will follow up tomorrow as discharge planning is finalized.    Update 0995: spoke to gabriel Buenrostro, explained plan is to assess pt back at facility to confirm hospice eligibility.  She prefers an admission Thursday afternoon so she can be present, will update hospice team for scheduling.  Chitra verbalized understanding of plan.    Thank you for this consult,  Trae Del Cid RN  [facility] liason  [phone]

## 2021-07-11 NOTE — PROGRESS NOTES
Patient with known atrial fibrillation with a rapid ventricular response.  Her heart rate is improved but not optimal although she has only been on beta-blockers for a couple of days.    Discussed with nurse and she is going into hospice and I would continue on her current medical regimen with the current dose of beta-blocker as she is likely to drift down in her heart rate.  This could be increased for comfort.    Patient does not complain of any shortness of breath today.  Heart rate ran below 100 through the night and just over 100 this morning while eating.  She appears comfortable.    Cardiology will sign off at this time.  Please call us if we can be of further assistance.

## 2021-07-11 NOTE — CONSULTS
Care Management Initial Consult    General Information  Assessment completed with: Family (gabriel Buenrostro [phone] is POA), gabriel Buenrostro [phone]  Type of CM/SW Visit: Initial Assessment    Primary Care Provider verified and updated as needed:     Readmission within the last 30 days:     Reason for Consult: discharge planning, end of life/hospice  Advance Care Planning: niece states patient does have HCD        Communication Assessment  Patient's communication style:  (speaks English, very Hard of Hearing even with hearing aides)    Hearing Difficulty or Deaf: yes   Wear Glasses or Blind: yes    Cognitive  Cognitive/Neuro/Behavioral: mood/behavior  Level of Consciousness: alert  Arousal Level: opens eyes spontaneously, arouses to voice  Orientation: disoriented to, place, time  Mood/Behavior: flat affect          Living Environment:   People in home: alone     Current living Arrangements: assisted living  Name of Facility:  ([facility])   Able to return to prior arrangements: yes       Family/Social Support:  Care provided by: other (see comments) ([facility] staff)  Provides care for: no one  Marital Status:   Other (specify) (three nicarlos- Chitra, [place] and Vonda)          Description of Support System:           Current Resources:   Patient receiving home care services: Yes     Community Resources:    Equipment currently used at home:  ([place] chair, hearing aides (2), Rx glasses)  Supplies currently used at home:      Employment/Financial:  Employment Status: other (see comments) (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     Chemical Dependency Status:      Gabriel denies CD history           Values/Beliefs:  Spiritual, Cultural Beliefs, Sabianist Practices, Values that affect care: yes  Description of Beliefs that Will Affect Care: \"she is Protestant\"            Additional Information:  CM spoke to Destiny ([phone]), Nurse who confirms patient resides at [facility] with enhanced services (the highest level of care that can be provided before transition to care center/LTC).   She is a sit to stand lift in the morning. Sits in a recliner. Won't use WC. Assist with medications, meals, housekeeping, laundry, transfers, incontinence cares, dressing, only will allow for showers on Fridays.   States patient is own decision maker and gabriel Buenrostro ([phone]) is involved in patient care.   Can includes orders for home PT/OT for lymphedema treatments in [facility] discharge orders and Nurse will arrange with [facility].   Can return to [facility] today if medically ready- will need new orders and Rx faxed to [phone]       CM received call from Trae ([phone]) with [facility] who reports having met with patient and nityrone yesterday at request of gabriel reaching out directly to hospice agency. CM spoke to MD- MD reports he placed an official Hospice consult.     3:10 PM  CM spoke to gabriel Buenrostro and it is her understanding that her and patient did sign on to [facility] and desire if for patient to return to [facility]. Will need [facility] transport.     She reports patient has had significant decline. No longer ambulates, refuses to get out of her sit to stand chair, refuses most cares including when PT was ordered with [facility]. Often times declines meals and bathing/showering or to even change clothes.     Bhavani Esposito RN          "

## 2021-07-11 NOTE — PROGRESS NOTES
Progress Notes by Lorri Aragon RN at 7/10/2021 10:43 PM     Author: Lorri Aragon RN Service: -- Author Type: Registered Nurse    Filed: 7/10/2021 10:43 PM Date of Service: 7/10/2021 10:43 PM Status: Signed    : Lorri Aragon RN (Registered Nurse)       Patient alert and oriented; forgetful at times. Weaned O2 down to 1L of NC from 3L NC to keep sats above 92%. VSS. Denies shortness of breath /chest pain/abd pain/nausea/vomiting. Afib- HR between .  Lymphedema wrap causing severe discomfort and pain, removed per patient request. BLE legs +2 edema; elevated on pillows. Turned and repositioned Q2. Ate 50% of dinner with much encouragement. Purewick in place. Had 1 small BM this shift.

## 2021-07-11 NOTE — PROGRESS NOTES
"Pt in bed all shift. Offered to use ceiling lift to put her in chair and she refused. Dependent on staff for all personal hygiene tasks.      Appetite poor this shift. Assisted with fluids and did drink when staff holds cup and lets her take drink from straw but would not  cup independently    Denied pain when asked.     Repositioned every 2 hours for comfort and to prevent skin breakdown.     Pt repeatedly stated \" I think it's my time, I'm 89 years old you know and I am ready to go\". Provided emotional support and presence.     Incontinent of both bowel and bladder. Pure wick used throughout shift to help with skin breakdown with urine incontinence.        "

## 2021-07-11 NOTE — PLAN OF CARE
Problem: Lymphedema Therapy    Dates: Start: 07/09/21     Disciplines: PT, OT, SLP   Goal: Lymphedema Goals    Dates: Start: 07/09/21   Expected End: 07/15/21     Description: Patient will demonstrate the following by 7/15/21, in order to maximize independence with ADL/IADL performance:  -Tolerate compression at least 23/24 hours per day to improve comfort in extremities.  -Demonstrate fluid loss sufficient to fit into appropriate compression garment for long-term management of edema.  -Patient/family to verbalize ability to direct care for edema management techniques.    Goals entered on 7/9/2021 by Lary Baires, PT, DPT          Lymphedema Therapy Discharge Summary  Refer to Care Plan goals above for progress towards goals at time of discharge.   Date of Lymphedema Discharge: 7/11/2021  Refer to daily documentation flowsheet for equipment issued.   Discharge Destination: Discharging home on hospice  Discharge Comments: Pt has not met goals above due to pain. Pt is discharging home on hospice.     Lary Baires PT, DPT

## 2021-07-11 NOTE — PROGRESS NOTES
[facility] MEDICINE PROGRESS NOTE      Identification/Summary: Raquel Brown is a 89 y.o. old female with history of hypertension and hypothyroidism admitted for new onset atrial fibrillation with RVR. Cardiology consulted for new onset afib. US thoracentesis ordered for diagnostic and therapeutic purposes. Initial pleural fluid studies demonstrate a transudative process. Hospice consultation requested on 7/10 and hospice consultation order placed and signed by myself on 7/10 in the afternoon. Epic EMR change on 7/11; another hospice order ([facility] IP Social Work order) placed and signed. Still on 1 L; hopefully off of oxygen and home hospice tomorrow; discussed with Hospice directly today and they cannot do the intake until tomorrow at the earliest, so will plan for discharge to home hospice hopefully without oxygen tomorrow. Weaning O2 in meantime.    Assessment and Plan:  Goals of Care  Hospice Care Patient  -Discussed with hospice directly today  -See above -   -Hospice consultation requested on 7/10 and hospice consultation order placed and signed by myself on 7/10 in the afternoon.   -Epic EMR change on 7/11; another hospice order ([facility] order) placed and signed.   -Hospice agency cannot do the intake until tomorrow at the earliest as no RN available today    New-onset atrial fibrillation with rapid ventricular response.   - Cardiology consultation for new onset atrial fibrillation with RVR.  - Continue diltiazem gtt and titrate to HR <110; plan to titrate off of IV medication and onto oral rate controlling medication as tolerated.  - Digoxin and metoprolol ordered as per cardiology. Recs much appreciated.  - Magnesium and potassium replacement orders in place; goal Mag>2.0 and K>4  - Now on 1 L.   - Would not start anticoagulation with home hospice unless patient strongly wishes for this. Will re-visit this with patient at discharge tomorrow AM.      Left  pleural effusion.   Probable/possible CAP PNA.  Acute Hypoxic respiratory failure  - Chest XR/CT revealed left pleural effusion.   - S/p ultrasound guided thoracentesis with cytology on 7/9 to determine etiology of pleural effusion.  - Initial pleural fluid studies demonstrate a transudative process, with medical cytology still pending. Follow-up remaining studies still pending.  - Discontinue IV ceftriaxone/azithromycin; procalcitonin not elevated and initial findings in ED consistent with pleural effusions which has been drained. Continue to monitor closely.   - O2 supplementation as needed maintaining O2 >92%. Wean as tolerated.   - RT support appreciated.  - IS     OTF.   - Most likely pre-renal in the setting of atrial fibrillation with rapid ventricular response (poor renal perfusion).   - Creatinine 1.3 initially, improved to 1.1 today.   - At baseline now.  - Hold nephrotoxic/nephrogenic medications. May consider restarting losartan later today if hypertensive vs tomorrow. SBP this Am in 100's.     DVTP: Heparin       Diet: Diet Regular  Code Status: No CPR- Do NOT Intubate    Anticipated possible discharge in 1-2 days [facility] - milestones for discharge: finalized rate-control oral agent dosing/frequency, appropriate oral anticoagulation plan; US pleural fluid cytology; clinical improvement including reduction in supplemental oxygen requirement with O2 weaning.    Interval History/Subjective:  NAEO.     No chest pain, no shortness of breath. No abd pain. No N/V. No palpitations. I discussed hospice with her, and Raquel confirms that she would like to go home with hospice once everything is appropriately arranged. This was before I discussed directly with both CM/SW and also Hospice as well. Answered all questions Raquel had.     Physical Exam/Objective:  Vitals I/O   Temp:  [97.2  F (36.2  C)-98.6  F (37  C)] 97.3  F (36.3  C)  Heart Rate:  [] 98  Resp:  [18-20] 20  BP: (107-137)/(56-98)  107/66  SpO2:  [93 %-99 %] 93 %  I/O last 3 completed shifts:  In: 600 [P.O.:600]  Out: 250 [Urine:250]   198 lb 1.6 oz (89.9 kg)  Body mass index is 31.97 kg/m .    GENERAL:  Alert, appears comfortable, in no acute distress, appears stated age   HEAD:  Normocephalic, without obvious abnormality, atraumatic   EYES:  PERRL, conjunctiva/corneas clear, no scleral icterus, EOM's intact   NOSE: Nares normal, septum midline, mucosa normal, no drainage   THROAT: Lips, mucosa, and tongue normal; teeth and gums normal, mouth moist   NECK: Supple, symmetrical, trachea midline   BACK:   Symmetric, no curvature, ROM normal   LUNGS:   Clear to auscultation bilaterally, no rales, rhonchi, or wheezing, symmetric chest rise on inhalation, respirations unlabored   CHEST WALL:  No tenderness or deformity   HEART:  Irregular rate and rhythm, S1 and S2 normal, no murmur, rub, or gallop    ABDOMEN:   Soft, non-tender, bowel sounds active all four quadrants, no masses, no organomegaly, no rebound or guarding   EXTREMITIES: Extremities normal, atraumatic, no cyanosis or edema    SKIN: Dry to touch, no exanthems in the visualized areas   NEURO: Alert, oriented x3, moves all four extremities freely   PSYCH: Cooperative, behavior is appropriate      Medications:   Personally Reviewed.    furosemide  20 mg Oral DAILY     levothyroxine  75 mcg Oral Daily 0600     magnesium oxide  400 mg Oral BID     melatonin  5 mg Oral QHS     metoprolol tartrate  25 mg Oral BID     potassium chloride ER  10 mEq Oral Once     sodium chloride  10 mL Intravenous Line Care     traZODone  25 mg Oral QHS       Data reviewed today: I personally reviewed all new medications, labs, imaging/diagnostics reports over the past 24 hours. Pertinent findings include:  Labs:  Serum   Pleural     Medical Cytology pending     Imaging:  Imaging results 30 days: Xr Chest 1 View Portable    Result Date: 7/8/2021  EXAM: XR CHEST 1 VIEW PORTABLE LOCATION: [facility] DATE/TIME: 7/8/2021 6:58 PM INDICATION: sob COMPARISON: 4/10/2015     New large left pleural effusion. Right lung clear other than stable small calcified granuloma. Heart size difficult to assess.    Us Thoracentesis    Result Date: 7/9/2021  EXAM: 1. LEFT THORACENTESIS 2. ULTRASOUND GUIDANCE LOCATION: [facility] DATE/TIME: 7/9/2021 4:07 PM INDICATION: Pleural effusion. PROCEDURE: Informed consent obtained. Time out performed. The chest was prepped and draped in sterile fashion. 15 mL of 1 % lidocaine was infused into the local soft tissues. Under direct ultrasound guidance, a 5 Bengali catheter system was placed into the pleural effusion. 2 separate punctures were made. The first drainage site yielded only 300 mL's, stopping after patient movement. 1.3 liters of clear yellow fluid were removed and sent to lab, if requested. Patient tolerated procedure well. Ultrasound imaging was obtained and placed in the patient's permanent medical record.     Status post left ultrasound-guided thoracentesis. Reference CPT Code: 65237      EKG: Personally reviewed.      Pete Armstrong  [facility]  Phone: #[phone]

## 2021-07-12 VITALS
BODY MASS INDEX: 31.52 KG/M2 | WEIGHT: 196.1 LBS | SYSTOLIC BLOOD PRESSURE: 105 MMHG | OXYGEN SATURATION: 98 % | DIASTOLIC BLOOD PRESSURE: 62 MMHG | HEART RATE: 93 BPM | RESPIRATION RATE: 20 BRPM | HEIGHT: 66 IN | TEMPERATURE: 98.3 F

## 2021-07-12 LAB
BACTERIA SPEC CULT: NO GROWTH
GRAM STAIN RESULT: NORMAL
GRAM STAIN RESULT: NORMAL
MAGNESIUM SERPL-MCNC: 1.6 MG/DL (ref 1.8–2.6)
POTASSIUM BLD-SCNC: 3.8 MMOL/L (ref 3.5–5)

## 2021-07-12 PROCEDURE — 99239 HOSP IP/OBS DSCHRG MGMT >30: CPT | Performed by: HOSPITALIST

## 2021-07-12 PROCEDURE — 36415 COLL VENOUS BLD VENIPUNCTURE: CPT | Performed by: HOSPITALIST

## 2021-07-12 PROCEDURE — 999N000157 HC STATISTIC RCP TIME EA 10 MIN

## 2021-07-12 PROCEDURE — 83735 ASSAY OF MAGNESIUM: CPT | Performed by: HOSPITALIST

## 2021-07-12 PROCEDURE — 250N000013 HC RX MED GY IP 250 OP 250 PS 637: Performed by: HOSPITALIST

## 2021-07-12 PROCEDURE — 84132 ASSAY OF SERUM POTASSIUM: CPT | Performed by: HOSPITALIST

## 2021-07-12 PROCEDURE — 250N000013 HC RX MED GY IP 250 OP 250 PS 637

## 2021-07-12 RX ORDER — LEVOTHYROXINE SODIUM 75 UG/1
75 TABLET ORAL DAILY
Status: DISCONTINUED | OUTPATIENT
Start: 2021-07-12 | End: 2021-07-12

## 2021-07-12 RX ORDER — METOPROLOL TARTRATE 25 MG/1
25 TABLET, FILM COATED ORAL 2 TIMES DAILY
Qty: 60 TABLET | Refills: 0 | Status: SHIPPED | OUTPATIENT
Start: 2021-07-12

## 2021-07-12 RX ORDER — LOSARTAN POTASSIUM 25 MG/1
25 TABLET ORAL DAILY
Status: DISCONTINUED | OUTPATIENT
Start: 2021-07-12 | End: 2021-07-12 | Stop reason: HOSPADM

## 2021-07-12 RX ORDER — FUROSEMIDE 20 MG
20 TABLET ORAL DAILY
Status: DISCONTINUED | OUTPATIENT
Start: 2021-07-12 | End: 2021-07-12

## 2021-07-12 RX ADMIN — LEVOTHYROXINE SODIUM 75 MCG: 75 TABLET ORAL at 04:47

## 2021-07-12 RX ADMIN — METOPROLOL TARTRATE 25 MG: 25 TABLET, FILM COATED ORAL at 08:25

## 2021-07-12 RX ADMIN — LOSARTAN POTASSIUM 25 MG: 25 TABLET, FILM COATED ORAL at 11:57

## 2021-07-12 RX ADMIN — FUROSEMIDE 20 MG: 20 TABLET ORAL at 08:25

## 2021-07-12 ASSESSMENT — MIFFLIN-ST. JEOR: SCORE: 1301.25

## 2021-07-12 NOTE — PLAN OF CARE
Problem: Adult Inpatient Plan of Care  Goal: Absence of Hospital-Acquired Illness or Injury  Intervention: Identify and Manage Fall Risk  Recent Flowsheet Documentation  Taken 7/12/2021 0115 by Vianca Eller, RN  Safety Promotion/Fall Prevention:   bed alarm on   lighting adjusted  Taken 7/11/2021 2000 by Vianca Eller, RN  Safety Promotion/Fall Prevention:   bed alarm on   lighting adjusted

## 2021-07-12 NOTE — PROGRESS NOTES
Care Management Discharge Note    Discharge Date: 07/12/2021  Expected Time of Departure:  (1600 hrs via stretcher transport.)    Discharge Disposition: Assisted Living, Home Care, Hospice    Discharge Services:  Home PT/OT vs. Hospice after Hospice evaluation at [facility].    Discharge Transportation: agency ("[employer] TRANSPORT.)    Education Provided on the Discharge Plan:  Yes. Pt's Delfino Buenrostro has been updated.  Persons Notified of Discharge Plans: MIKEL RN, bedside RN, and pt's Delfino Buenrostro.  Patient/Family in Agreement with the Plan: yes    Additional Information:  This writer(YONG) has coordinated transfer back to [facility].    Pt will be transferring back to her [facility] with [facility] referral for Hospice- to be evaluated at the [facility].    Also, pt will get home PT/OT therapy through [facility] at the [facility] if not appropriate for Hospice.    Pt will be transferring to the [facility] via  [employer] stretcher transport d/t O2 demands and inability to manage O2 equipment independently.    Pt's Nityrone has been updated and is in agreement with the plan for transfer.    No further needs identified.    Karl Felton, RN

## 2021-07-12 NOTE — DISCHARGE SUMMARY
[facility] MEDICINE  DISCHARGE SUMMARY     Primary Care Physician: System, Provider Not In  Admission Date: 7/8/2021   Discharge Provider: Pete Lemon MD Discharge Date: 7/13/2021   Diet:   Active Diet and Nourishment Order   Procedures     Advance Diet as Tolerated       Code Status: No CPR- Do NOT Intubate   Activity: DCACTIVITY: Activity as tolerated and no driving for today        Condition at Discharge: Guarded     REASON FOR PRESENTATION(See Admission Note for Details)   Raquel Brown is a 89 y.o. old female with history of hypertension and hypothyroidism admitted for new onset atrial fibrillation with RVR. Cardiology consulted for new onset afib.     PRINCIPAL & ACTIVE DISCHARGE DIAGNOSES     Active Problems:    Atrial fibrillation (H)    Left pleural effusion    Acute hypoxic respiratory failure    OTF    PENDING LABS     Unresulted Labs Ordered in the Past 30 Days of this Admission     No orders found from 6/8/2021 to 7/9/2021.            PROCEDURES ( this hospitalization only)          RECOMMENDATIONS TO OUTPATIENT PROVIDER FOR F/U VISIT     Follow-up Appointments     Follow-up and recommended labs and tests       Follow up with primary care provider, Provider Not In System, within 1-14   days for hospital follow- up.  No follow up labs or test are needed.      Hospice will follow-up with you as discussed.             -Hospice  -She wished to defer anticoagulation at this time with afib diagnosis. Re-evaluate this depending on her hospice consultation and determination.     DISPOSITION     Home facility with outpatient in-facility/home hospice consultation    SUMMARY OF HOSPITAL COURSE:      Raquel Brown is a 89 y.o. old female with history of hypertension and hypothyroidism admitted for new onset atrial fibrillation with RVR.     She was started on a diltiazem gtt for rate-control. Cardiology was consulted for new onset afib. She was eventually transitioned to oral  metoprolol for rate-control as per cardiology. US thoracentesis was ordered for diagnostic and therapeutic purposes. Initial pleural fluid studies demonstrated a transudative process. Patient and her mPOA wished for re-assessing goals of care, and eventually a hospice consultation was performed. Patient was informed on risks and benefits of anticoagulation for afib and she wished to defer starting anticoagulation. She wished to return back to her home facility. Hospice has arranged for an on-site assessment at her home facility to occur after discharge within a few days. PCP follow-up recommended/arranged as able.     Discharge Medications with Med changes:     Discharge Medication List as of 7/12/2021 12:52 PM      START taking these medications    Details   metoprolol tartrate (LOPRESSOR) 25 MG tablet Take 1 tablet (25 mg) by mouth 2 times daily, Disp-60 tablet, R-0, E-Prescribe         CONTINUE these medications which have NOT CHANGED    Details   !! acetaminophen (TYLENOL) 650 MG CR tablet Take 1,300 mg by mouth every evening, Historical      B Complex-C (VITAMIN B + C COMPLEX PO) Take 1 tablet by mouth daily, Historical      !! cholecalciferol 25 MCG (1000 UT) TABS Take 2,000 Units by mouth daily, Historical      furosemide (LASIX) 20 MG tablet Take 20 mg by mouth daily, Historical      guaiFENesin (ROBITUSSIN) 100 MG/5ML SYRP Take 300 mg by mouth, Historical      levothyroxine (SYNTHROID/LEVOTHROID) 75 MCG tablet Take 75 mcg by mouth daily, Historical      losartan (COZAAR) 25 MG tablet Take 25 mg by mouth daily, Historical      !! melatonin 3 MG tablet Take 3 mg by mouth nightly as needed If patient needs , give before 1:00 AM, Historical      methyl salicylate-menthol (ICY HOT) ointment Apply topically 2 times daily as neededHistorical      senna-docusate (SENOKOT-S/PERICOLACE) 8.6-50 MG tablet Take 1 tablet by mouth daily as needed for constipation, Historical      traZODone (DESYREL) 50 MG tablet Take 25  mg by mouth At Bedtime, Historical      !! vitamin C (ASCORBIC ACID) 500 MG tablet Take 500 mg by mouth daily, Historical      !! acetaminophen (TYLENOL) 650 MG CR tablet [ACETAMINOPHEN (TYLENOL) 650 MG CR TABLET] Take 1,300 mg by mouth every evening., Historical      !! ascorbic acid, vitamin C, (VITAMIN C) 500 MG tablet [ASCORBIC ACID, VITAMIN C, (VITAMIN C) 500 MG TABLET] Take 500 mg by mouth daily., Historical      !! cholecalciferol, vitamin D3, 1,000 unit (25 mcg) tablet [CHOLECALCIFEROL, VITAMIN D3, 1,000 UNIT (25 MCG) TABLET] Take 2,000 Units by mouth daily., Historical      !! melatonin 3 mg Tab tablet [MELATONIN 3 MG TAB TABLET] Take 3 mg by mouth at bedtime as needed. If patient needs give prior to 1 AM, Historical      !! melatonin 5 mg Tab tablet [MELATONIN 5 MG TAB TABLET] Take 5 mg by mouth at bedtime., Historical      vitamin B complex-folic acid (B COMPLEX 1, WITH FOLIC ACID,) 0.4 mg Tab [VITAMIN B COMPLEX-FOLIC ACID (B COMPLEX 1, WITH FOLIC ACID,) 0.4 MG TAB] Take 1 tablet by mouth daily., Historical       !! - Potential duplicate medications found. Please discuss with provider.                Rationale for medication changes:      Metoprolol for new-onset afib as per cardiology  Patient wished to defer anticoagulation when asked        Consults   Cardiology   Palliative Care, Hospice    SOCIAL WORK IP CONSULT  LYMPHEDEMA THERAPY IP CONSULT  LYMPHEDEMA THERAPY IP CONSULT  SOCIAL WORK IP CONSULT  SOCIAL WORK IP CONSULT    Immunizations given this encounter     Most Recent Immunizations   Administered Date(s) Administered     COVID-19,PF,Moderna 02/08/2021           Anticoagulation Information      Recent INR results: No results for input(s): INR in the last 168 hours.  Warfarin doses (if applicable) or name of other anticoagulant: Patient wished to defer anticoagulation       SIGNIFICANT IMAGING FINDINGS     Results for orders placed or performed during the hospital encounter of 07/08/21   XR Chest  Port 1 View    Impression    New large left pleural effusion. Right lung clear other than stable small calcified granuloma. Heart size difficult to assess.   US Thoracentesis    Impression    Status post left ultrasound-guided thoracentesis.    Reference CPT Code: 80084   Echocardiogram Complete   Result Value Ref Range    LV volume diastolic 97.1 46.0 - 106.0 cm3    LV volume systolic 43.5 (A) 14.0 - 42.0 cm3    HR 94 bpm    INTERVENTRICULAR SEPTUM IN END DIASTOLE 0.904 (A) 0.6 - 0.9 cm    LVIDd 4.81 3.8 - 5.2 cm    LVIDs 2.94 2.2 - 3.5 cm    LVOT diam 1.9 cm    LEFT VENTRICULAR OUTFLOW TRACT MEAN GRADIENT 1 mmHg    LVOT peak VTI 14.7 cm    LEFT VENTRICULAR OUTFLOW TRACT MEAN VELOCITY 51.3 cm/s    LVOT peak gina 78.1 cm/s    LEFT VENTRICULAR OUTFLOW TRACT PEAK GRADIENT 2 mmHg    LV PWd 0.914 (A) 0.6 - 0.9 cm    MV E'TISSUE GINA-LAT 8.92 cm/s    MV E' med gina 7.94 cm/s    AV mean gina 49.2 cm/s    AV mean gradient 1 mmHg    AV VTI 14.2 cm    AV peak gina 74.3 cm/s    AO root 3.5 cm    LA size 3.1 cm    LEFT ATRIUM LENGTH 3.9 cm    MV decel slope 3,720 mm/s2    MV decel time 256 ms    MV P 1/2 time 75 ms    MV peak E gina 94.6 cm/s    NC peak gina 111 cm/s    NC MAX PG 5 mmHg    PV accel time 92 ms    TR peak gina 142 cm/s    LA AREA 2 13.8 cm2    LA AREA 1 17.1 cm2    BSA 2.07 m2    End systolic index (mL/m2) 66 in    End diastolic index (mL/m2) 3,169.6 lbs    /64 mmHg    IVS/PW RATIO 1.0     TRICUSPID REGURGITATION PEAK PRESSURE GRADIENT 8.1 mmHg    LV FS 38.9 28.0 - 44.0 %    Ejection Fraction 55 55 - 75 %    LA volume 51.4 mL    AO ascending 3.7 cm    LV mass 150.3 g    AV area 2.9 cm2    AV DIM IND gina 1.1     MV area p 1/2 time 2.9 cm2    LVOT area 2.83 cm2    LVOT SV 41.7 cm3    AV peak gradient 2.2 mmHg    LV systolic volume index 21.0 8.0 - 24.0 cm3/m2    LV diastolic volume index 46.9 29.0 - 61.0 cm3/m2    LA volume index 24.8 mL/m2    LEFT VENTRICLE MASS INDEX 72.6 g/m2    LV SVi 20.1 ml/m2    TAPSE 1.9 cm     MV med E/e' ratio 11.9     MV lat E/e' ratio 10.6     LV CO 3.9 l/min    LV Ci 1.9 l/min/m2    Height 66.0 in    Weight 198 lbs    MV AVERAGE E/E' RATIO 11.2 cm/s    AV DIMENSIONLESS INDEX VTI 1.0     Echo LVEF Estimated 50 %       SIGNIFICANT LABORATORY FINDINGS     Most Recent 3 CBC's:Recent Labs   Lab Test 05/13/21  1119 03/19/21  0903 10/15/19  1048   WBC 8.8 9.5 7.8   HGB 13.1 12.6 12.3   MCV 93 94 99    313 310     Most Recent 3 BMP's:Recent Labs   Lab Test 07/12/21  0531 07/11/21  0614 05/13/21  1119 03/19/21  0903 10/15/19  1048   NA  --   --  138 141 139   POTASSIUM 3.8 3.9 3.7 3.7 4.5   CHLORIDE  --   --  98 100 100   CO2  --   --  28 29 30   BUN  --   --  32* 30* 31*   CR  --  0.92 1.33* 1.22* 1.39*   ANIONGAP  --   --  12 12 9   GRISEL  --   --  9.1 9.0 9.3   GLC  --   --  98 95 86         Discharge Orders        Ambulatory refferal to Home Health      Reason for your hospital stay    Admitted for atrial fibrillation with rapid ventricular response as well as left sided pleural effusion. Hospice has been consulted and will re-evaluate you at your home facility.     Activity - Up ad brennan     Follow Up (TCM)    Follow up with primary care provider, Provider Not In System, within 7-14 days for hospital follow- up.  No follow up labs or test are needed.      Appointments on [facility] and/or [facility] (with [facility] or [facility] provider or service). Call [phone] if you haven't heard regarding these appointments within 7 days of discharge.    Hospice will follow-up with you as discussed.     Follow-up and recommended labs and tests     Follow up with primary care provider, Provider Not In System, within 1-14 days for hospital follow- up.  No follow up labs or test are needed.      Hospice will follow-up with you as discussed.     No CPR- Do NOT Intubate     Fall precautions     Oxygen Adult/Peds    Oxygen Documentation:   I certify that this patient, Raquel Brown has been under my care (or a  nurse practitioner or physican's assistant working with me). This is the face-to-face encounter for oxygen medical necessity.      Raquel Brown is now in a chronic stable state and continues to require supplemental oxygen. Patient has continued oxygen desaturation due to Chronic Respiratory Failure with Hypoxia J93.11.    Alternative treatment(s) tried or considered and deemed clinically infective for treatment of Chronic Respiratory Failure with Hypoxia J93.11 include nebulizers, inhalers, pulmonary toileting, and pulmonary rehab.  If portability is ordered, is the patient mobile within the home? yes    **Patients who qualify for home O2 coverage under the CMS guidelines require ABG tests or O2 sat readings obtained closest to, but no earlier than 2 days prior to the discharge, as evidence of the need for home oxygen therapy. Testing must be performed while patient is in the chronic stable state. See notes for O2 sats.**     Advance Diet as Tolerated    Follow this diet upon discharge: Orders Placed This Encounter      Regular Diet Adult       Examination   Physical Exam   Temp:  [98.3  F (36.8  C)] 98.3  F (36.8  C)  Pulse:  [93] 93  Resp:  [20] 20  BP: (105)/(62) 105/62  SpO2:  [98 %] 98 %  Wt Readings from Last 1 Encounters:   07/12/21 89 kg (196 lb 1.6 oz)     GENERAL:  Alert, appears comfortable, in no acute distress, appears stated age on 1-2 L of O2 via NC   HEAD:  Normocephalic, without obvious abnormality, atraumatic   EYES:  PERRL, conjunctiva/corneas clear, no scleral icterus, EOM's intact   NOSE: Nares normal, septum midline, mucosa normal, no drainage   THROAT: Lips, mucosa, and tongue normal; teeth and gums normal, mouth moist   NECK: Supple, symmetrical, trachea midline   BACK:   Symmetric, no curvature, ROM normal   LUNGS:   Clear to auscultation bilaterally, no rales, rhonchi, or wheezing, symmetric chest rise on inhalation, respirations unlabored   CHEST WALL:  No tenderness or deformity    HEART:  Regular rate and rhythm, S1 and S2 normal, no murmur, rub, or gallop    ABDOMEN:   Soft, non-tender, bowel sounds active all four quadrants, no masses, no organomegaly, no rebound or guarding   EXTREMITIES: Extremities normal, atraumatic, no cyanosis or edema    SKIN: Dry to touch, no exanthems in the visualized areas   NEURO: Alert, oriented to her reported baseline, moves all four extremities freely, non-focal   PSYCH: Cooperative, behavior is appropriate      Please see EMR for more detailed significant labs, imaging, consultant notes etc.    I, Pete Lemon MD, personally saw the patient today and spent greater than 30 minutes discharging this patient.    Pete Lemon MD  Internal Medicine  [facility]  Phone: #[phone]    CC:System, Provider Not In

## 2021-07-12 NOTE — PROGRESS NOTES
RT evaluated pt for home 02. At this time pt doesn't qualify for home O2. Pt never dropped below the thresh hold of 88%. Pt also wasn't qualified with activity due to her cares being dependent.     Leslie Chavez, RT

## 2021-07-12 NOTE — PROGRESS NOTES
"CLINICAL NUTRITION SERVICES - ASSESSMENT NOTE     Nutrition Prescription    RECOMMENDATIONS FOR MDs/PROVIDERS TO ORDER:  Rec a daily MVI for wound healing     Malnutrition Status:    Not noted     Recommendations already ordered by Registered Dietitian (RD):  I ordered Ensure Enlive bid          REASON FOR ASSESSMENT  Raquel Brown is a/an 89 year old female assessed by the dietitian for Admission Nutrition Risk Screen. Pt admitted with new onset a fib, hospice care. Pleural effusion, OTF    Nutrition History  Pt is very hard of hearing. She feels her appetite has been good. She states it is only recently down. She doesn't drink supplements at home, but she is open to them     CURRENT NUTRITION ORDERS  Diet: regular   Intake/Tolerance: documented as 50%    LABS  Labs reviewed; Mg-1.6    MEDICATIONS  Medications reviewed Lasix     ANTHROPOMETRICS  Height: 167.6 cm (5' 6\")  Most Recent Weight: 89 kg (196 lb 1.6 oz)    IBW: 59kg  BMI: Obesity Grade I BMI 30-34.9  Weight History:   Wt Readings from Last 5 Encounters:   07/12/21 89 kg (196 lb 1.6 oz)       ASSESSED NUTRITION NEEDS  Estimated Energy Needs: 4610-9526 kcals/day (25 - 30 kcals/kg)  Justification: Obese  Estimated Protein Needs: 70-89 grams protein/day (1.2-1.5 grams of pro/kg)  Justification: wound  Estimated Fluid Needs: 8210-7854 mL/day (25 - 30 mL/kg)   Justification: Maintenance    PHYSICAL FINDINGS  See malnutrition section below.  None noted     Skin  Stage 1 pressure sore on buttocks   Edema: 1+ generalized, 3+ bilateral LE edema     MALNUTRITION  % Intake: </= 50% for >/= 5 days (severe)  % Weight Loss: None noted  Subcutaneous Fat Loss: None observed  Muscle Loss: None observed  Fluid Accumulation/Edema: Severe  Malnutrition Diagnosis: Patient does not meet two of the established criteria necessary for diagnosing malnutrition but is at risk for malnutrition    NUTRITION DIAGNOSIS  Increased nutrient needs related to wounds as evidenced by " nonhealing       INTERVENTIONS  Implementation  Start Ensure Enlive bid   Rec a daily MVI    Goals  Meet est needs  Promote wound healing     Monitoring/Evaluation  Progress toward goals will be monitored and evaluated per protocol.

## 2021-07-13 LAB — BACTERIA SPEC CULT: NORMAL

## 2021-07-14 LAB
CAP COMMENT: NORMAL
LAB AP CHARGES (HE HISTORICAL CONVERSION): NORMAL
LAB MED GENERAL PATH INTERP (HE HISTORICAL CONVERSION): NORMAL
PATH REPORT.COMMENTS IMP SPEC: NORMAL
PATH REPORT.COMMENTS IMP SPEC: NORMAL
PATH REPORT.FINAL DX SPEC: NORMAL
PATH REPORT.RELEVANT HX SPEC: NORMAL
SPECIMEN DESCRIPTION: NORMAL

## 2022-03-25 NOTE — ED TRIAGE NOTES
ED Triage Notes by Carmela Douglass RN at 7/8/2021  6:20 PM     Author: Carmela Douglass RN Service: -- Author Type: Registered Nurse    Filed: 7/8/2021  6:21 PM Date of Service: 7/8/2021  6:20 PM Status: Signed    : Carmela Douglass RN (Registered Nurse)       Patient was sent here from the nursing home with a fib with RVR new onset, shortness of breath noted.         0

## (undated) RX ORDER — IPRATROPIUM BROMIDE AND ALBUTEROL SULFATE 2.5; .5 MG/3ML; MG/3ML
SOLUTION RESPIRATORY (INHALATION)
Status: DISPENSED
Start: 2021-07-11